# Patient Record
Sex: MALE | Race: WHITE | Employment: FULL TIME | URBAN - METROPOLITAN AREA
[De-identification: names, ages, dates, MRNs, and addresses within clinical notes are randomized per-mention and may not be internally consistent; named-entity substitution may affect disease eponyms.]

---

## 2017-01-10 ENCOUNTER — OFFICE VISIT (OUTPATIENT)
Dept: INTEGRATIVE MEDICINE | Facility: HOSPITAL | Age: 62
End: 2017-01-10
Attending: GENERAL ACUTE CARE HOSPITAL

## 2017-01-10 DIAGNOSIS — Z56.6 STRESS AT WORK: ICD-10-CM

## 2017-01-10 DIAGNOSIS — E78.5 HYPERLIPIDEMIA WITH TARGET LDL LESS THAN 100: Primary | ICD-10-CM

## 2017-01-10 DIAGNOSIS — R73.9 HIGH BLOOD SUGAR: ICD-10-CM

## 2017-01-10 DIAGNOSIS — E78.00 HIGH CHOLESTEROL: ICD-10-CM

## 2017-01-10 PROCEDURE — 97810 ACUP 1/> WO ESTIM 1ST 15 MIN: CPT | Performed by: ACUPUNCTURIST

## 2017-01-10 PROCEDURE — 97811 ACUP 1/> W/O ESTIM EA ADD 15: CPT | Performed by: ACUPUNCTURIST

## 2017-01-10 SDOH — HEALTH STABILITY - MENTAL HEALTH: OTHER PHYSICAL AND MENTAL STRAIN RELATED TO WORK: Z56.6

## 2017-01-10 NOTE — PROGRESS NOTES
Atiya Martinez has reviewed the herbs with his doctor and is going to start them. He has not done anything yet because of the busy schedule. His plan is to order them this week and then we will follow up in a month.     We will work on stress reduction to help improv

## 2017-02-14 ENCOUNTER — OFFICE VISIT (OUTPATIENT)
Dept: INTEGRATIVE MEDICINE | Facility: HOSPITAL | Age: 62
End: 2017-02-14
Attending: GENERAL ACUTE CARE HOSPITAL

## 2017-02-14 DIAGNOSIS — E78.5 HYPERLIPIDEMIA WITH TARGET LDL LESS THAN 100: Primary | ICD-10-CM

## 2017-02-14 DIAGNOSIS — Z56.6 STRESS AT WORK: ICD-10-CM

## 2017-02-14 SDOH — HEALTH STABILITY - MENTAL HEALTH: OTHER PHYSICAL AND MENTAL STRAIN RELATED TO WORK: Z56.6

## 2017-02-17 NOTE — PROGRESS NOTES
Quan Rachel finds the treatments to help keep him more calm. 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   He just got the herbs in the mail yester Aviculare 7.0 4.2% $0.88 $1.06  Qu Capri Dianthus 7.0 4.2% $0.88 $1.06  Ronnell Martínez Lycium Fruit 6.0 3.6% $1.05 $1.26  Pietro Hoyos (Piotr Hunter) Clematis Armandi 3.0 1.8% $0.38 $0.46  Total:   166.0   $35.83 $43.00  Formula and ingredient pricing are for comparison pur

## 2017-02-28 ENCOUNTER — OFFICE VISIT (OUTPATIENT)
Dept: INTEGRATIVE MEDICINE | Facility: HOSPITAL | Age: 62
End: 2017-02-28
Attending: GENERAL ACUTE CARE HOSPITAL

## 2017-02-28 DIAGNOSIS — E78.5 HYPERLIPIDEMIA WITH TARGET LDL LESS THAN 100: Primary | ICD-10-CM

## 2017-02-28 DIAGNOSIS — R73.9 HIGH BLOOD SUGAR: ICD-10-CM

## 2017-02-28 DIAGNOSIS — Z56.6 STRESS AT WORK: ICD-10-CM

## 2017-02-28 SDOH — HEALTH STABILITY - MENTAL HEALTH: OTHER PHYSICAL AND MENTAL STRAIN RELATED TO WORK: Z56.6

## 2017-02-28 NOTE — PROGRESS NOTES
Honey Pappas has been managing his stress levels and feeling well in the process. Stress overall is much reduced and he feels that any that he does have, he has been able to handle easily.   He has started taking the herbal formula and they have not caused any diges

## 2017-04-21 ENCOUNTER — OFFICE VISIT (OUTPATIENT)
Dept: INTEGRATIVE MEDICINE | Facility: HOSPITAL | Age: 62
End: 2017-04-21
Attending: GENERAL ACUTE CARE HOSPITAL

## 2017-04-21 DIAGNOSIS — Z56.6 STRESS AT WORK: Primary | ICD-10-CM

## 2017-04-21 DIAGNOSIS — M25.551 RIGHT HIP PAIN: ICD-10-CM

## 2017-04-21 SDOH — HEALTH STABILITY - MENTAL HEALTH: OTHER PHYSICAL AND MENTAL STRAIN RELATED TO WORK: Z56.6

## 2017-04-21 NOTE — PROGRESS NOTES
Estevan had a zaida Easter in [place] with his family and then was cleaning books and things out of his house. He was lifting a box that was too heavy and he strained the right side of his hip in the anterior side along the inguinal groove.  It does not fe

## 2017-05-30 ENCOUNTER — APPOINTMENT (OUTPATIENT)
Dept: INTEGRATIVE MEDICINE | Facility: HOSPITAL | Age: 62
End: 2017-05-30
Attending: GENERAL ACUTE CARE HOSPITAL

## 2017-06-01 ENCOUNTER — OFFICE VISIT (OUTPATIENT)
Dept: INTEGRATIVE MEDICINE | Facility: HOSPITAL | Age: 62
End: 2017-06-01
Attending: GENERAL ACUTE CARE HOSPITAL

## 2017-06-01 DIAGNOSIS — Z56.6 STRESS AT WORK: Primary | ICD-10-CM

## 2017-06-01 SDOH — HEALTH STABILITY - MENTAL HEALTH: OTHER PHYSICAL AND MENTAL STRAIN RELATED TO WORK: Z56.6

## 2017-06-01 NOTE — PROGRESS NOTES
Milana Bhatt has been feeling well and wanting to make sure that his stress is manageable. He has been doing a 21 day fast that he is finishing up. It is a standard process fast and he has been doing well. He has lost 10 pounds.     Objective:    Assessment:Liver qi

## 2017-06-29 ENCOUNTER — APPOINTMENT (OUTPATIENT)
Dept: INTEGRATIVE MEDICINE | Facility: HOSPITAL | Age: 62
End: 2017-06-29
Attending: GENERAL ACUTE CARE HOSPITAL

## 2017-08-01 PROBLEM — R22.1 MASS OF NECK: Status: ACTIVE | Noted: 2017-08-01

## 2017-08-10 ENCOUNTER — HOSPITAL ENCOUNTER (OUTPATIENT)
Facility: HOSPITAL | Age: 62
Setting detail: HOSPITAL OUTPATIENT SURGERY
Discharge: HOME | End: 2017-08-10
Attending: SURGERY | Admitting: SURGERY
Payer: COMMERCIAL

## 2017-08-10 ENCOUNTER — ANESTHESIA EVENT (OUTPATIENT)
Dept: SURGERY | Facility: HOSPITAL | Age: 62
End: 2017-08-10
Payer: COMMERCIAL

## 2017-08-10 ENCOUNTER — SURGERY (OUTPATIENT)
Age: 62
End: 2017-08-10

## 2017-08-10 ENCOUNTER — ANESTHESIA (OUTPATIENT)
Dept: SURGERY | Facility: HOSPITAL | Age: 62
End: 2017-08-10
Payer: COMMERCIAL

## 2017-08-10 VITALS
WEIGHT: 227 LBS | SYSTOLIC BLOOD PRESSURE: 107 MMHG | HEART RATE: 66 BPM | RESPIRATION RATE: 16 BRPM | OXYGEN SATURATION: 97 % | HEIGHT: 74 IN | TEMPERATURE: 98 F | DIASTOLIC BLOOD PRESSURE: 63 MMHG | BODY MASS INDEX: 29.13 KG/M2

## 2017-08-10 DIAGNOSIS — M79.89 MASS OF SOFT TISSUE: Primary | ICD-10-CM

## 2017-08-10 PROCEDURE — 88305 TISSUE EXAM BY PATHOLOGIST: CPT | Performed by: SURGERY

## 2017-08-10 PROCEDURE — 88304 TISSUE EXAM BY PATHOLOGIST: CPT | Performed by: SURGERY

## 2017-08-10 PROCEDURE — 0JB50ZZ EXCISION OF LEFT NECK SUBCUTANEOUS TISSUE AND FASCIA, OPEN APPROACH: ICD-10-PCS | Performed by: SURGERY

## 2017-08-10 RX ORDER — MORPHINE SULFATE 2 MG/ML
2 INJECTION, SOLUTION INTRAMUSCULAR; INTRAVENOUS EVERY 2 HOUR PRN
Status: DISCONTINUED | OUTPATIENT
Start: 2017-08-10 | End: 2017-08-10

## 2017-08-10 RX ORDER — SODIUM CHLORIDE, SODIUM LACTATE, POTASSIUM CHLORIDE, CALCIUM CHLORIDE 600; 310; 30; 20 MG/100ML; MG/100ML; MG/100ML; MG/100ML
INJECTION, SOLUTION INTRAVENOUS CONTINUOUS PRN
Status: DISCONTINUED | OUTPATIENT
Start: 2017-08-10 | End: 2017-08-10 | Stop reason: SURG

## 2017-08-10 RX ORDER — MORPHINE SULFATE 2 MG/ML
2 INJECTION, SOLUTION INTRAMUSCULAR; INTRAVENOUS EVERY 10 MIN PRN
Status: DISCONTINUED | OUTPATIENT
Start: 2017-08-10 | End: 2017-08-10

## 2017-08-10 RX ORDER — HYDROCODONE BITARTRATE AND ACETAMINOPHEN 5; 325 MG/1; MG/1
2 TABLET ORAL AS NEEDED
Status: DISCONTINUED | OUTPATIENT
Start: 2017-08-10 | End: 2017-08-10

## 2017-08-10 RX ORDER — SODIUM CHLORIDE, SODIUM LACTATE, POTASSIUM CHLORIDE, CALCIUM CHLORIDE 600; 310; 30; 20 MG/100ML; MG/100ML; MG/100ML; MG/100ML
INJECTION, SOLUTION INTRAVENOUS CONTINUOUS
Status: DISCONTINUED | OUTPATIENT
Start: 2017-08-10 | End: 2017-08-10

## 2017-08-10 RX ORDER — EPHEDRINE SULFATE 50 MG/ML
INJECTION, SOLUTION INTRAVENOUS AS NEEDED
Status: DISCONTINUED | OUTPATIENT
Start: 2017-08-10 | End: 2017-08-10 | Stop reason: SURG

## 2017-08-10 RX ORDER — DOCUSATE SODIUM 100 MG/1
100 CAPSULE, LIQUID FILLED ORAL 2 TIMES DAILY
Qty: 14 CAPSULE | Refills: 1 | Status: SHIPPED | OUTPATIENT
Start: 2017-08-10 | End: 2017-08-17

## 2017-08-10 RX ORDER — ROCURONIUM BROMIDE 10 MG/ML
INJECTION, SOLUTION INTRAVENOUS AS NEEDED
Status: DISCONTINUED | OUTPATIENT
Start: 2017-08-10 | End: 2017-08-10 | Stop reason: SURG

## 2017-08-10 RX ORDER — DEXAMETHASONE SODIUM PHOSPHATE 4 MG/ML
VIAL (ML) INJECTION AS NEEDED
Status: DISCONTINUED | OUTPATIENT
Start: 2017-08-10 | End: 2017-08-10 | Stop reason: SURG

## 2017-08-10 RX ORDER — ACETAMINOPHEN 325 MG/1
650 TABLET ORAL EVERY 4 HOURS PRN
Status: DISCONTINUED | OUTPATIENT
Start: 2017-08-10 | End: 2017-08-10

## 2017-08-10 RX ORDER — HYDROCODONE BITARTRATE AND ACETAMINOPHEN 7.5; 325 MG/1; MG/1
2 TABLET ORAL EVERY 4 HOURS PRN
Status: DISCONTINUED | OUTPATIENT
Start: 2017-08-10 | End: 2017-08-10

## 2017-08-10 RX ORDER — KETOROLAC TROMETHAMINE 30 MG/ML
INJECTION, SOLUTION INTRAMUSCULAR; INTRAVENOUS AS NEEDED
Status: DISCONTINUED | OUTPATIENT
Start: 2017-08-10 | End: 2017-08-10 | Stop reason: SURG

## 2017-08-10 RX ORDER — LIDOCAINE HYDROCHLORIDE 10 MG/ML
INJECTION, SOLUTION EPIDURAL; INFILTRATION; INTRACAUDAL; PERINEURAL AS NEEDED
Status: DISCONTINUED | OUTPATIENT
Start: 2017-08-10 | End: 2017-08-10 | Stop reason: SURG

## 2017-08-10 RX ORDER — HYDROMORPHONE HYDROCHLORIDE 1 MG/ML
0.6 INJECTION, SOLUTION INTRAMUSCULAR; INTRAVENOUS; SUBCUTANEOUS EVERY 5 MIN PRN
Status: DISCONTINUED | OUTPATIENT
Start: 2017-08-10 | End: 2017-08-10

## 2017-08-10 RX ORDER — FAMOTIDINE 20 MG/1
20 TABLET ORAL ONCE
Status: DISCONTINUED | OUTPATIENT
Start: 2017-08-10 | End: 2017-08-10 | Stop reason: HOSPADM

## 2017-08-10 RX ORDER — ACETAMINOPHEN 325 MG/1
650 TABLET ORAL ONCE
Status: COMPLETED | OUTPATIENT
Start: 2017-08-10 | End: 2017-08-10

## 2017-08-10 RX ORDER — CLINDAMYCIN PHOSPHATE 900 MG/50ML
900 INJECTION INTRAVENOUS ONCE
Status: COMPLETED | OUTPATIENT
Start: 2017-08-10 | End: 2017-08-10

## 2017-08-10 RX ORDER — NEOSTIGMINE METHYLSULFATE 0.5 MG/ML
INJECTION INTRAVENOUS AS NEEDED
Status: DISCONTINUED | OUTPATIENT
Start: 2017-08-10 | End: 2017-08-10 | Stop reason: SURG

## 2017-08-10 RX ORDER — METOCLOPRAMIDE 10 MG/1
10 TABLET ORAL ONCE
Status: DISCONTINUED | OUTPATIENT
Start: 2017-08-10 | End: 2017-08-10 | Stop reason: HOSPADM

## 2017-08-10 RX ORDER — HYDROMORPHONE HYDROCHLORIDE 1 MG/ML
0.2 INJECTION, SOLUTION INTRAMUSCULAR; INTRAVENOUS; SUBCUTANEOUS EVERY 5 MIN PRN
Status: DISCONTINUED | OUTPATIENT
Start: 2017-08-10 | End: 2017-08-10

## 2017-08-10 RX ORDER — HYDROMORPHONE HYDROCHLORIDE 1 MG/ML
0.4 INJECTION, SOLUTION INTRAMUSCULAR; INTRAVENOUS; SUBCUTANEOUS EVERY 5 MIN PRN
Status: DISCONTINUED | OUTPATIENT
Start: 2017-08-10 | End: 2017-08-10

## 2017-08-10 RX ORDER — HYDROCODONE BITARTRATE AND ACETAMINOPHEN 7.5; 325 MG/1; MG/1
1 TABLET ORAL EVERY 4 HOURS PRN
Status: DISCONTINUED | OUTPATIENT
Start: 2017-08-10 | End: 2017-08-10

## 2017-08-10 RX ORDER — MIDAZOLAM HYDROCHLORIDE 1 MG/ML
INJECTION INTRAMUSCULAR; INTRAVENOUS AS NEEDED
Status: DISCONTINUED | OUTPATIENT
Start: 2017-08-10 | End: 2017-08-10 | Stop reason: SURG

## 2017-08-10 RX ORDER — BUPIVACAINE HYDROCHLORIDE AND EPINEPHRINE 2.5; 5 MG/ML; UG/ML
INJECTION, SOLUTION INFILTRATION; PERINEURAL AS NEEDED
Status: DISCONTINUED | OUTPATIENT
Start: 2017-08-10 | End: 2017-08-10 | Stop reason: HOSPADM

## 2017-08-10 RX ORDER — HYDROCODONE BITARTRATE AND ACETAMINOPHEN 5; 325 MG/1; MG/1
1 TABLET ORAL AS NEEDED
Status: DISCONTINUED | OUTPATIENT
Start: 2017-08-10 | End: 2017-08-10

## 2017-08-10 RX ORDER — HYDROCODONE BITARTRATE AND ACETAMINOPHEN 7.5; 325 MG/1; MG/1
1-2 TABLET ORAL EVERY 6 HOURS PRN
Qty: 30 TABLET | Refills: 0 | Status: SHIPPED | OUTPATIENT
Start: 2017-08-10 | End: 2017-12-26

## 2017-08-10 RX ORDER — ONDANSETRON 2 MG/ML
INJECTION INTRAMUSCULAR; INTRAVENOUS AS NEEDED
Status: DISCONTINUED | OUTPATIENT
Start: 2017-08-10 | End: 2017-08-10 | Stop reason: SURG

## 2017-08-10 RX ORDER — MORPHINE SULFATE 4 MG/ML
4 INJECTION, SOLUTION INTRAMUSCULAR; INTRAVENOUS EVERY 10 MIN PRN
Status: DISCONTINUED | OUTPATIENT
Start: 2017-08-10 | End: 2017-08-10

## 2017-08-10 RX ORDER — ONDANSETRON 2 MG/ML
4 INJECTION INTRAMUSCULAR; INTRAVENOUS ONCE AS NEEDED
Status: DISCONTINUED | OUTPATIENT
Start: 2017-08-10 | End: 2017-08-10

## 2017-08-10 RX ORDER — HALOPERIDOL 5 MG/ML
0.25 INJECTION INTRAMUSCULAR ONCE AS NEEDED
Status: DISCONTINUED | OUTPATIENT
Start: 2017-08-10 | End: 2017-08-10

## 2017-08-10 RX ORDER — NALOXONE HYDROCHLORIDE 0.4 MG/ML
80 INJECTION, SOLUTION INTRAMUSCULAR; INTRAVENOUS; SUBCUTANEOUS AS NEEDED
Status: DISCONTINUED | OUTPATIENT
Start: 2017-08-10 | End: 2017-08-10

## 2017-08-10 RX ORDER — MORPHINE SULFATE 4 MG/ML
6 INJECTION, SOLUTION INTRAMUSCULAR; INTRAVENOUS EVERY 10 MIN PRN
Status: DISCONTINUED | OUTPATIENT
Start: 2017-08-10 | End: 2017-08-10

## 2017-08-10 RX ORDER — GLYCOPYRROLATE 0.2 MG/ML
INJECTION INTRAMUSCULAR; INTRAVENOUS AS NEEDED
Status: DISCONTINUED | OUTPATIENT
Start: 2017-08-10 | End: 2017-08-10 | Stop reason: SURG

## 2017-08-10 RX ORDER — MORPHINE SULFATE 4 MG/ML
4 INJECTION, SOLUTION INTRAMUSCULAR; INTRAVENOUS EVERY 2 HOUR PRN
Status: DISCONTINUED | OUTPATIENT
Start: 2017-08-10 | End: 2017-08-10

## 2017-08-10 RX ORDER — MORPHINE SULFATE 4 MG/ML
6 INJECTION, SOLUTION INTRAMUSCULAR; INTRAVENOUS EVERY 2 HOUR PRN
Status: DISCONTINUED | OUTPATIENT
Start: 2017-08-10 | End: 2017-08-10

## 2017-08-10 RX ORDER — ONDANSETRON 2 MG/ML
4 INJECTION INTRAMUSCULAR; INTRAVENOUS EVERY 6 HOURS PRN
Status: DISCONTINUED | OUTPATIENT
Start: 2017-08-10 | End: 2017-08-10

## 2017-08-10 RX ORDER — METOCLOPRAMIDE HYDROCHLORIDE 5 MG/ML
10 INJECTION INTRAMUSCULAR; INTRAVENOUS EVERY 6 HOURS PRN
Status: DISCONTINUED | OUTPATIENT
Start: 2017-08-10 | End: 2017-08-10

## 2017-08-10 RX ADMIN — LIDOCAINE HYDROCHLORIDE 50 MG: 10 INJECTION, SOLUTION EPIDURAL; INFILTRATION; INTRACAUDAL; PERINEURAL at 09:09:00

## 2017-08-10 RX ADMIN — KETOROLAC TROMETHAMINE 30 MG: 30 INJECTION, SOLUTION INTRAMUSCULAR; INTRAVENOUS at 10:20:00

## 2017-08-10 RX ADMIN — SODIUM CHLORIDE, SODIUM LACTATE, POTASSIUM CHLORIDE, CALCIUM CHLORIDE: 600; 310; 30; 20 INJECTION, SOLUTION INTRAVENOUS at 10:37:00

## 2017-08-10 RX ADMIN — ROCURONIUM BROMIDE 10 MG: 10 INJECTION, SOLUTION INTRAVENOUS at 09:44:00

## 2017-08-10 RX ADMIN — EPHEDRINE SULFATE 10 MG: 50 INJECTION, SOLUTION INTRAVENOUS at 09:32:00

## 2017-08-10 RX ADMIN — SODIUM CHLORIDE, SODIUM LACTATE, POTASSIUM CHLORIDE, CALCIUM CHLORIDE: 600; 310; 30; 20 INJECTION, SOLUTION INTRAVENOUS at 09:02:00

## 2017-08-10 RX ADMIN — ONDANSETRON 4 MG: 2 INJECTION INTRAMUSCULAR; INTRAVENOUS at 10:15:00

## 2017-08-10 RX ADMIN — EPHEDRINE SULFATE 5 MG: 50 INJECTION, SOLUTION INTRAVENOUS at 09:41:00

## 2017-08-10 RX ADMIN — NEOSTIGMINE METHYLSULFATE 3.5 MG: 0.5 INJECTION INTRAVENOUS at 10:15:00

## 2017-08-10 RX ADMIN — GLYCOPYRROLATE 0.6 MG: 0.2 INJECTION INTRAMUSCULAR; INTRAVENOUS at 10:15:00

## 2017-08-10 RX ADMIN — DEXAMETHASONE SODIUM PHOSPHATE 4 MG: 4 MG/ML VIAL (ML) INJECTION at 09:15:00

## 2017-08-10 RX ADMIN — EPHEDRINE SULFATE 5 MG: 50 INJECTION, SOLUTION INTRAVENOUS at 10:12:00

## 2017-08-10 RX ADMIN — MIDAZOLAM HYDROCHLORIDE 2 MG: 1 INJECTION INTRAMUSCULAR; INTRAVENOUS at 09:02:00

## 2017-08-10 RX ADMIN — ROCURONIUM BROMIDE 40 MG: 10 INJECTION, SOLUTION INTRAVENOUS at 09:10:00

## 2017-08-10 RX ADMIN — CLINDAMYCIN PHOSPHATE 900 G: 900 INJECTION INTRAVENOUS at 09:10:00

## 2017-08-10 NOTE — CONSULTS
Verona Stewart is a 57year old male. Patient presents with:  Consult: lipoma on RT side of neck      HPI:    HPI: Verona Stewart is a 57year old  male who presents for evaluation of soft tissue mass of the neck. It has been present for 10 years. Cardiovascular:   Denies Chest pain at rest.   Gastrointestinal:   Denies Difficulty swallowing. Hematology:   Denies Prolonged bleeding. Genitourinary:   Denies Blood in urine. Musculoskeletal:   Denies Painful joints.    Peripheral Vascular:   Mark Anthony Burr contact and decision-making  And coordinating the patient's care including greater than 50% face-to-face was  60  minutes.      Martina miguel      .

## 2017-08-10 NOTE — OPERATIVE REPORT
[facility]    PATIENT'S NAME: Audie    ATTENDING PHYSICIAN: Damaris Smith MD   OPERATING PHYSICIAN: Damaris Smith MD   PATIENT ACCOUNT#:   [de-identified]    LOCATION:  [address]  MEDICAL RECORD #:   [id number] incision was made with a 15 blade. Dissection continued down through subcutaneous tissue using electrocautery. Medial and lateral skin flaps were made with electrocautery.   A lipomatous mass was identified and dissected free all the way down to the regio

## 2017-08-10 NOTE — ANESTHESIA PREPROCEDURE EVALUATION
Anesthesia PreOp Note    HPI:     Betty Hogue is a 57year old male who presents for preoperative consultation requested by: Carlee Blood MD    Date of Surgery: 8/10/2017    Procedure(s):  EXCISION LESION HEAD/NECK/FACE  Indication: Soft tissu Kerri Zimmerman MD      No current [facility]-ordered outpatient prescriptions on file.       Pcn [Penicillamine]     Rash    Family History   Problem Relation Age of Onset   • Dementia Father    • Asthma Father    • Arthritis Mother    • Heart Disorder Adeel Adams FONG  8/10/2017 8:16 AM

## 2017-08-10 NOTE — ANESTHESIA POSTPROCEDURE EVALUATION
Patient: Boris Khanna    Procedure Summary     Date:  08/10/17 Room / Location:  [address] [place] [address] MAIN OR 03 / [address] [place] [address] MAIN OR    Anesthesia Start:  0902 Anesthesia Stop:      Procedure:  EXCISION LESION HEAD/NECK/FACE (N/A ) Diagnosis:  (Soft tissue mass right occipi

## 2017-08-10 NOTE — BRIEF OP NOTE
Pre-Operative Diagnosis: Soft tissue mass right occipital region      Post-Operative Diagnosis: Soft tissue mass right occipital/ neck  region      Procedure Performed:   Procedure(s):  Excision soft tissue mass right occipital/ posterior neck region

## 2017-08-10 NOTE — H&P (VIEW-ONLY)
Ronnie Randall is a 57year old male. Patient presents with:  Consult: lipoma on RT side of neck      HPI:    HPI: Ronnie Randall is a 57year old  male who presents for evaluation of soft tissue mass of the neck. It has been present for 10 years. Cardiovascular:   Denies Chest pain at rest.   Gastrointestinal:   Denies Difficulty swallowing. Hematology:   Denies Prolonged bleeding. Genitourinary:   Denies Blood in urine. Musculoskeletal:   Denies Painful joints.    Peripheral Vascular:   Radha Méndez contact and decision-making  And coordinating the patient's care including greater than 50% face-to-face was  60  minutes.      Martina miguel      .

## 2017-08-10 NOTE — INTERVAL H&P NOTE
Pre-op Diagnosis: Soft tissue mass right occipital region     The above referenced H&P was reviewed by Sonja Benavidez MD on 8/10/2017, the patient was examined and no significant changes have occurred in the patient's condition since the H&P was perfor

## 2017-12-25 ENCOUNTER — HOSPITAL ENCOUNTER (OUTPATIENT)
Age: 62
Discharge: HOME HEALTH | End: 2017-12-25

## 2017-12-25 PROCEDURE — 90471 IMMUNIZATION ADMIN: CPT

## 2017-12-25 PROCEDURE — 90686 IIV4 VACC NO PRSV 0.5 ML IM: CPT

## 2018-02-19 PROCEDURE — 84153 ASSAY OF PSA TOTAL: CPT | Performed by: UROLOGY

## 2018-02-19 PROCEDURE — 36415 COLL VENOUS BLD VENIPUNCTURE: CPT | Performed by: UROLOGY

## 2019-03-08 ENCOUNTER — APPOINTMENT (OUTPATIENT)
Dept: GENERAL RADIOLOGY | Age: 64
End: 2019-03-08
Attending: EMERGENCY MEDICINE
Payer: COMMERCIAL

## 2019-03-08 ENCOUNTER — HOSPITAL ENCOUNTER (OUTPATIENT)
Age: 64
Discharge: HOME | End: 2019-03-08
Attending: EMERGENCY MEDICINE
Payer: COMMERCIAL

## 2019-03-08 VITALS
RESPIRATION RATE: 20 BRPM | HEART RATE: 77 BPM | DIASTOLIC BLOOD PRESSURE: 84 MMHG | TEMPERATURE: 98 F | OXYGEN SATURATION: 96 % | SYSTOLIC BLOOD PRESSURE: 144 MMHG

## 2019-03-08 DIAGNOSIS — S81.809A OPEN WOUND OF LOWER EXTREMITY, UNSPECIFIED LATERALITY, INITIAL ENCOUNTER: Primary | ICD-10-CM

## 2019-03-08 PROCEDURE — 99213 OFFICE O/P EST LOW 20 MIN: CPT

## 2019-03-08 PROCEDURE — 99214 OFFICE O/P EST MOD 30 MIN: CPT

## 2019-03-08 PROCEDURE — 90471 IMMUNIZATION ADMIN: CPT

## 2019-03-08 PROCEDURE — 73590 X-RAY EXAM OF LOWER LEG: CPT | Performed by: EMERGENCY MEDICINE

## 2019-03-08 RX ORDER — SULFAMETHOXAZOLE AND TRIMETHOPRIM 800; 160 MG/1; MG/1
1 TABLET ORAL 2 TIMES DAILY
Qty: 10 TABLET | Refills: 0 | Status: SHIPPED | OUTPATIENT
Start: 2019-03-08 | End: 2019-03-13

## 2019-03-08 NOTE — ED PROVIDER NOTES
Patient Seen in: [address]    History   Patient presents with:  Rash Skin Problem (integumentary)    Stated Complaint: left shin abrasion    HPI    Patient states he struck his left leg today about 8 hours ago while  ge Current:/84   Pulse 77   Temp 98.3 °F (36.8 °C) (Oral)   Resp 20   SpO2 96%         Physical Exam    Patient is awake and alert  Extremities upon exam of the left leg there is a 5 cm linear wound present to the anterior tibial surface of the mi Impression:  Open wound of lower extremity, unspecified laterality, initial encounter  (primary encounter diagnosis)    Disposition:  Discharge  3/8/2019  5:23 pm    Follow-up:  Michael Keenan[facility]  [phone]

## 2019-03-08 NOTE — ED NOTES
Patient states he hit his left calf hard on the tub at a hotel this morning. He has a abrasion on his left middle anterior calf. No active bleeding. He also has some swelling and bruising under the abrasion.   He is able to walk on his leg without diffic

## 2019-03-08 NOTE — ED NOTES
Steri strips applied to wound per Dr. Kimberly Rudd. Protective dressing applied per tech. Discussed with patient wound care. All questions answered.

## 2020-11-06 PROBLEM — R93.1 AGATSTON CAC SCORE, <100: Status: ACTIVE | Noted: 2020-11-06

## 2021-01-20 ENCOUNTER — IMMUNIZATION (OUTPATIENT)
Dept: LAB | Facility: HOSPITAL | Age: 66
End: 2021-01-20
Attending: EMERGENCY MEDICINE
Payer: COMMERCIAL

## 2021-01-20 DIAGNOSIS — Z23 NEED FOR VACCINATION: Primary | ICD-10-CM

## 2021-01-20 PROCEDURE — 0011A SARSCOV2 VAC 100MCG/0.5ML IM: CPT

## 2021-02-28 ENCOUNTER — IMMUNIZATION (OUTPATIENT)
Dept: LAB | Facility: HOSPITAL | Age: 66
End: 2021-02-28
Attending: EMERGENCY MEDICINE
Payer: COMMERCIAL

## 2021-02-28 DIAGNOSIS — Z23 NEED FOR VACCINATION: Primary | ICD-10-CM

## 2021-02-28 PROCEDURE — 0012A SARSCOV2 VAC 100MCG/0.5ML IM: CPT

## 2021-04-11 ENCOUNTER — HOSPITAL ENCOUNTER (OUTPATIENT)
Age: 66
Discharge: HOME | End: 2021-04-11
Payer: COMMERCIAL

## 2021-04-11 VITALS
RESPIRATION RATE: 18 BRPM | OXYGEN SATURATION: 97 % | HEART RATE: 70 BPM | SYSTOLIC BLOOD PRESSURE: 134 MMHG | TEMPERATURE: 98 F | DIASTOLIC BLOOD PRESSURE: 83 MMHG

## 2021-04-11 DIAGNOSIS — R21 RASH: Primary | ICD-10-CM

## 2021-04-11 PROCEDURE — 99203 OFFICE O/P NEW LOW 30 MIN: CPT | Performed by: NURSE PRACTITIONER

## 2021-04-11 NOTE — ED PROVIDER NOTES
Patient Seen in: [facility]      History   Patient presents with:  Rash Skin Problem    Stated Complaint: skin problem    HPI/Subjective:   HPI    50-year-old male, with history of asthma and high cholesterol, presents to the immediate care Physical Exam  Vitals and nursing note reviewed. Constitutional:       General: He is not in acute distress. Appearance: Normal appearance. He is not ill-appearing.    HENT:      Mouth/Throat:      Pharynx: No oropharyngeal exudate or posterior oropha Prescribed:  Current Discharge Medication List    START taking these medications    triamcinolone acetonide 0.1 % External Cream  Apply topically 2 (two) times daily for 7 days.   Qty: 15 g Refills: 0    bacitracin 500 UNIT/GM External Ointment  Apply 1 Braulio

## 2021-10-13 ENCOUNTER — OFFICE VISIT (OUTPATIENT)
Dept: INTERNAL MEDICINE CLINIC | Facility: CLINIC | Age: 66
End: 2021-10-13
Payer: COMMERCIAL

## 2021-10-13 ENCOUNTER — LAB ENCOUNTER (OUTPATIENT)
Dept: LAB | Facility: HOSPITAL | Age: 66
End: 2021-10-13
Attending: INTERNAL MEDICINE
Payer: COMMERCIAL

## 2021-10-13 VITALS
HEIGHT: 74 IN | RESPIRATION RATE: 14 BRPM | WEIGHT: 213 LBS | DIASTOLIC BLOOD PRESSURE: 80 MMHG | OXYGEN SATURATION: 96 % | SYSTOLIC BLOOD PRESSURE: 110 MMHG | HEART RATE: 72 BPM | BODY MASS INDEX: 27.34 KG/M2

## 2021-10-13 DIAGNOSIS — Z00.00 ADULT GENERAL MEDICAL EXAM: Primary | ICD-10-CM

## 2021-10-13 DIAGNOSIS — Z12.11 SCREEN FOR COLON CANCER: ICD-10-CM

## 2021-10-13 DIAGNOSIS — Z23 NEED FOR INFLUENZA VACCINATION: ICD-10-CM

## 2021-10-13 DIAGNOSIS — Z00.00 ADULT GENERAL MEDICAL EXAM: ICD-10-CM

## 2021-10-13 PROBLEM — D17.9 LIPOMA: Status: ACTIVE | Noted: 2021-10-13

## 2021-10-13 PROCEDURE — 85027 COMPLETE CBC AUTOMATED: CPT | Performed by: INTERNAL MEDICINE

## 2021-10-13 PROCEDURE — 3074F SYST BP LT 130 MM HG: CPT | Performed by: INTERNAL MEDICINE

## 2021-10-13 PROCEDURE — 84443 ASSAY THYROID STIM HORMONE: CPT | Performed by: INTERNAL MEDICINE

## 2021-10-13 PROCEDURE — 90471 IMMUNIZATION ADMIN: CPT | Performed by: INTERNAL MEDICINE

## 2021-10-13 PROCEDURE — 83036 HEMOGLOBIN GLYCOSYLATED A1C: CPT | Performed by: INTERNAL MEDICINE

## 2021-10-13 PROCEDURE — 90662 IIV NO PRSV INCREASED AG IM: CPT | Performed by: INTERNAL MEDICINE

## 2021-10-13 PROCEDURE — 3008F BODY MASS INDEX DOCD: CPT | Performed by: INTERNAL MEDICINE

## 2021-10-13 PROCEDURE — 36415 COLL VENOUS BLD VENIPUNCTURE: CPT | Performed by: INTERNAL MEDICINE

## 2021-10-13 PROCEDURE — 99387 INIT PM E/M NEW PAT 65+ YRS: CPT | Performed by: INTERNAL MEDICINE

## 2021-10-13 PROCEDURE — 80053 COMPREHEN METABOLIC PANEL: CPT | Performed by: INTERNAL MEDICINE

## 2021-10-13 PROCEDURE — 80061 LIPID PANEL: CPT | Performed by: INTERNAL MEDICINE

## 2021-10-13 PROCEDURE — 3079F DIAST BP 80-89 MM HG: CPT | Performed by: INTERNAL MEDICINE

## 2021-10-13 RX ORDER — TADALAFIL 20 MG/1
20 TABLET ORAL
COMMUNITY

## 2021-10-13 RX ORDER — LORATADINE 10 MG/1
10 TABLET ORAL DAILY
COMMUNITY
End: 2021-10-13

## 2021-10-13 NOTE — PROGRESS NOTES
rAmando Clayton is a 76year old male. Patient presents with:  Establish Care: NP here to establish care and his yearly Physical Exam   Physical    HPI:   74-year-old gentleman with past medical history of dyslipidemia here to establish care and for physi use: Never     Family History   Problem Relation Age of Onset   • Dementia Father    • Asthma Father    • Arthritis Mother    • Heart Disorder Mother    • Hypertension Mother    • Lipids Mother         Pcn [Penicillins]       RASH     REVIEW OF SYSTEMS: Neck:      Vascular: No carotid bruit. Cardiovascular:      Rate and Rhythm: Normal rate and regular rhythm. Pulses: Normal pulses. Heart sounds: Normal heart sounds. No murmur heard.       Pulmonary:      Effort: Pulmonary effort is normal. N T4  - LIPID PANEL  - PSA SCREEN; Future    2. Screen for colon cancer    - GASTRO - INTERNAL    3. Need for influenza vaccination    - FLU VACC HIGH DOSE PRSV FREE    Plan: Labs ordered. Colonoscopy referral given. Flu vaccine given.   If everything is go

## 2022-04-23 ENCOUNTER — NURSE ONLY (OUTPATIENT)
Dept: GASTROENTEROLOGY | Facility: CLINIC | Age: 67
End: 2022-04-23

## 2022-04-23 DIAGNOSIS — Z12.11 SCREEN FOR COLON CANCER: Primary | ICD-10-CM

## 2022-04-23 NOTE — PROGRESS NOTES
Avery Lee patient for his scheduled telephone colon screening. Would prefer to schedule with Dr. Andria Nichols @ [address] [place] [address] PCP note: \"Health maintenance-he reports that he had a colonoscopy in 2016. His previous gastroenterology office called him for a repeat colonoscopy. He prefers to complete it in [employer]. \"     CBC from 10/13/2021 show no signs of anemia     Last Procedure, Date, MD:  Note found in care everywhere:   Date Name Performed by Cleo Lundy   04/05/2016 Colonoscopy  Notes:  Dr Fran Boyd - 10 years f/u Information not available    04/05/2016 Colonoscopy  Notes:  Normal       Anticoagulants: no  Diabetic Meds: no   BP meds(Ace inhibitors/ARB's): no  Weight loss meds (phentermine/vyvanse): no  Iron supplement (RX/OTC): multi vit  Height & Weight/BMI: 6'2\"/213lbs/27  Hx of Cardiac/CVA issues/(MI/Stroke): no  Devices Pacemaker/Defibrillator/Stents: no  Resp. Issues/Oxygen Use/JULIA/COPD: no  Issues w/Anesthesia: no    Symptoms (Y/N): no     Family history of colon cancer: no    Medications, pharmacy, and allergies verified with patient over the phone. Please advise on orders and prep, thank you.

## 2022-04-25 RX ORDER — POLYETHYLENE GLYCOL 3350, SODIUM CHLORIDE, SODIUM BICARBONATE, POTASSIUM CHLORIDE 420; 11.2; 5.72; 1.48 G/4L; G/4L; G/4L; G/4L
POWDER, FOR SOLUTION ORAL
Qty: 4000 ML | Refills: 0 | Status: SHIPPED | OUTPATIENT
Start: 2022-04-25

## 2022-04-25 NOTE — PROGRESS NOTES
Dania Workmans you for the quick response. Unfortunately, patient Stefany Ambrocio is utilizing [employer] therefore can only go to [facility] but it's a MAC only site. Please advise if MAC sedation would be appropriate for patient.      Thank you!!

## 2022-04-29 NOTE — PROGRESS NOTES
Called patient to help assist with scheduling procedure.  [facility]

## 2022-06-22 ENCOUNTER — TELEPHONE (OUTPATIENT)
Dept: GASTROENTEROLOGY | Facility: CLINIC | Age: 67
End: 2022-06-22

## 2022-06-22 NOTE — TELEPHONE ENCOUNTER
Stacy [facility]  12:29 PM                     Patient is calling regarding what code they are using for the procedure. Patient has questions regarding procedure.

## 2022-06-22 NOTE — TELEPHONE ENCOUNTER
Pt returned call and was given codes listed below. He will call insurance and let this office know if he needs anything further.

## 2022-06-22 NOTE — TELEPHONE ENCOUNTER
Left message for patient to return call.      Codes are in instructions     Procedure: 70769   Diagnosis: Z12.11 - colorectal cancer screening

## 2022-06-23 NOTE — TELEPHONE ENCOUNTER
Call transferred from phone room. Spoke to patient and clarified prep drinking times. Patient verbalized understanding of split dose prep.

## 2022-06-24 ENCOUNTER — SURGERY CENTER DOCUMENTATION (OUTPATIENT)
Dept: SURGERY | Age: 67
End: 2022-06-24

## 2022-06-24 DIAGNOSIS — K63.5 POLYP OF DESCENDING COLON, UNSPECIFIED TYPE: ICD-10-CM

## 2022-06-24 DIAGNOSIS — K57.30 DIVERTICULA OF COLON: ICD-10-CM

## 2022-06-29 ENCOUNTER — TELEPHONE (OUTPATIENT)
Dept: GASTROENTEROLOGY | Facility: CLINIC | Age: 67
End: 2022-06-29

## 2022-06-30 NOTE — TELEPHONE ENCOUNTER
Left VM re: path from [facility]    -polyp removed is a tubular adenoma of colon      GI staff: please place recall for colonoscopy in 5 years.  unknown hx of CRC

## 2022-07-01 NOTE — TELEPHONE ENCOUNTER
Health maintenance updated. Last colonoscopy done 6/24/22. 5 year recall placed into [facility], next due on 6/24/27 per Dr. Josie Sanchez.

## 2022-11-15 ENCOUNTER — TELEPHONE (OUTPATIENT)
Dept: GASTROENTEROLOGY | Facility: CLINIC | Age: 67
End: 2022-11-15

## 2022-11-15 NOTE — TELEPHONE ENCOUNTER
Patient requesting to speak with RN regarding his CLN results from June 2022. Please call - ok to leave detailed message. Thank you.

## 2022-11-15 NOTE — TELEPHONE ENCOUNTER
Contacted patient and verified . Reviewed result note with patient. Patient verbalized understanding.

## 2022-11-22 ENCOUNTER — HOSPITAL ENCOUNTER (OUTPATIENT)
Age: 67
Discharge: HOME | End: 2022-11-22
Payer: COMMERCIAL

## 2022-11-22 VITALS
DIASTOLIC BLOOD PRESSURE: 77 MMHG | SYSTOLIC BLOOD PRESSURE: 125 MMHG | OXYGEN SATURATION: 98 % | HEIGHT: 74 IN | RESPIRATION RATE: 16 BRPM | TEMPERATURE: 98 F | BODY MASS INDEX: 27.59 KG/M2 | WEIGHT: 215 LBS | HEART RATE: 63 BPM

## 2022-11-22 DIAGNOSIS — T14.8XXA SUPERFICIAL LACERATION: Primary | ICD-10-CM

## 2022-11-22 PROCEDURE — 99213 OFFICE O/P EST LOW 20 MIN: CPT | Performed by: NURSE PRACTITIONER

## 2022-11-22 RX ORDER — SULFAMETHOXAZOLE AND TRIMETHOPRIM 800; 160 MG/1; MG/1
1 TABLET ORAL 2 TIMES DAILY
Qty: 10 TABLET | Refills: 0 | Status: SHIPPED | OUTPATIENT
Start: 2022-11-22 | End: 2022-11-27

## 2022-11-23 NOTE — ED INITIAL ASSESSMENT (HPI)
Patient states yesterday morning he cut his chin while shaving and believes it may be infected. He has been using neosporin but it hasn't been healing.

## 2022-11-23 NOTE — DISCHARGE INSTRUCTIONS
Wash gently twice daily with soap and water. Apply antibiotic ointment twice daily. Take the Bactrim as prescribed. Follow-up as needed with your doctor. Return for any concerns.

## 2023-02-20 ENCOUNTER — HOSPITAL ENCOUNTER (OUTPATIENT)
Age: 68
Discharge: HOME | End: 2023-02-20
Payer: COMMERCIAL

## 2023-02-20 VITALS
RESPIRATION RATE: 16 BRPM | SYSTOLIC BLOOD PRESSURE: 134 MMHG | OXYGEN SATURATION: 98 % | DIASTOLIC BLOOD PRESSURE: 82 MMHG | TEMPERATURE: 98 F | HEART RATE: 94 BPM

## 2023-02-20 DIAGNOSIS — J02.9 ACUTE PHARYNGITIS, UNSPECIFIED ETIOLOGY: ICD-10-CM

## 2023-02-20 DIAGNOSIS — R03.0 ELEVATED BLOOD PRESSURE READING: ICD-10-CM

## 2023-02-20 DIAGNOSIS — J01.90 ACUTE NON-RECURRENT SINUSITIS, UNSPECIFIED LOCATION: Primary | ICD-10-CM

## 2023-02-20 PROCEDURE — 99203 OFFICE O/P NEW LOW 30 MIN: CPT | Performed by: PHYSICIAN ASSISTANT

## 2023-02-20 RX ORDER — BENZONATATE 100 MG/1
100 CAPSULE ORAL 3 TIMES DAILY PRN
Qty: 30 CAPSULE | Refills: 0 | Status: SHIPPED | OUTPATIENT
Start: 2023-02-20 | End: 2023-03-22

## 2023-02-20 RX ORDER — ROSUVASTATIN CALCIUM 10 MG/1
10 TABLET, COATED ORAL NIGHTLY
COMMUNITY

## 2023-02-20 RX ORDER — CEFDINIR 300 MG/1
300 CAPSULE ORAL 2 TIMES DAILY
Qty: 20 CAPSULE | Refills: 0 | Status: SHIPPED | OUTPATIENT
Start: 2023-02-20 | End: 2023-03-02

## 2023-02-20 RX ORDER — ALBUTEROL SULFATE 90 UG/1
2 AEROSOL, METERED RESPIRATORY (INHALATION) EVERY 4 HOURS PRN
Qty: 1 EACH | Refills: 0 | Status: SHIPPED | OUTPATIENT
Start: 2023-02-20 | End: 2023-03-22

## 2023-04-03 ENCOUNTER — TELEPHONE (OUTPATIENT)
Dept: PHYSICAL MEDICINE AND REHAB | Facility: CLINIC | Age: 68
End: 2023-04-03

## 2023-04-03 ENCOUNTER — HOSPITAL ENCOUNTER (OUTPATIENT)
Dept: GENERAL RADIOLOGY | Facility: HOSPITAL | Age: 68
Discharge: HOME | End: 2023-04-03
Attending: PHYSICAL MEDICINE & REHABILITATION
Payer: COMMERCIAL

## 2023-04-03 ENCOUNTER — OFFICE VISIT (OUTPATIENT)
Dept: PHYSICAL MEDICINE AND REHAB | Facility: CLINIC | Age: 68
End: 2023-04-03
Payer: COMMERCIAL

## 2023-04-03 VITALS — DIASTOLIC BLOOD PRESSURE: 72 MMHG | BODY MASS INDEX: 28 KG/M2 | SYSTOLIC BLOOD PRESSURE: 128 MMHG | WEIGHT: 220 LBS

## 2023-04-03 DIAGNOSIS — M51.37 DDD (DEGENERATIVE DISC DISEASE), LUMBOSACRAL: ICD-10-CM

## 2023-04-03 DIAGNOSIS — M47.816 LUMBAR SPONDYLOSIS: ICD-10-CM

## 2023-04-03 DIAGNOSIS — G25.89 SCAPULAR DYSKINESIS: ICD-10-CM

## 2023-04-03 DIAGNOSIS — M51.36 BULGE OF LUMBAR DISC WITHOUT MYELOPATHY: ICD-10-CM

## 2023-04-03 DIAGNOSIS — M16.11 PRIMARY OSTEOARTHRITIS OF RIGHT HIP: ICD-10-CM

## 2023-04-03 DIAGNOSIS — M54.59 MECHANICAL LOW BACK PAIN: ICD-10-CM

## 2023-04-03 DIAGNOSIS — M79.10 MYALGIA: Primary | ICD-10-CM

## 2023-04-03 DIAGNOSIS — M99.9 BIOMECHANICAL LESION: ICD-10-CM

## 2023-04-03 DIAGNOSIS — M70.62 GREATER TROCHANTERIC BURSITIS OF BOTH HIPS: ICD-10-CM

## 2023-04-03 DIAGNOSIS — M47.816 FACET SYNDROME, LUMBAR: ICD-10-CM

## 2023-04-03 DIAGNOSIS — M70.61 GREATER TROCHANTERIC BURSITIS OF BOTH HIPS: ICD-10-CM

## 2023-04-03 DIAGNOSIS — S76.019A STRAIN OF GLUTEUS MEDIUS, UNSPECIFIED LATERALITY, INITIAL ENCOUNTER: ICD-10-CM

## 2023-04-03 DIAGNOSIS — M48.061 LUMBAR FORAMINAL STENOSIS: ICD-10-CM

## 2023-04-03 DIAGNOSIS — M54.16 LUMBAR RADICULOPATHY: ICD-10-CM

## 2023-04-03 DIAGNOSIS — M79.10 MYALGIA: ICD-10-CM

## 2023-04-03 PROCEDURE — 72110 X-RAY EXAM L-2 SPINE 4/>VWS: CPT | Performed by: PHYSICAL MEDICINE & REHABILITATION

## 2023-04-03 NOTE — PATIENT INSTRUCTIONS
1) Continue Ibuprofen 400-600 mg every 8 hours as need for pain  2) Please get X-rays of the Lumbar spine today on your way out. 3) Tylenol 500-1000 mg every 6-8 hours as needed for pain. No more than 3000 mg daily. 4) Please call and schedule your MRI at [phone]. Once you have your MRI scheduled, then call my office again to schedule a follow-up visit soon after your MRI so we may review the images together. 5) My office will call you to schedule the RIGHT greater trochanter bursa CSI under ultrasound guidance once the procedure is approved by your insurance carrier. 6) Lets touch base after the MRI to review the images and discuss next steps. We can also consider right hip lidocaine injection as a diagnostic approach      Post Injection Instructions     Please do not do anything strenuous over the next two days (if you had a knee injection do not walk more than 2 city blocks, do not attend any aerobic classes, do not run, no heavy lifting, no prolong standing). You may resume your day to day activities after your injection. You may experience some mild amount of swelling after the procedure. Please ice your joint that was injected at least 5-6 times a day (15 minutes) for two days after (this will help prevent worsening pain that sometimes occurs after an injection). Only take tylenol if needed for pain for the first few days. Watch for signs of infection which include redness, warmth, worsening pain, fevers or chills. If you develop any of these signs call the office immediately at [phone]    Everyone responds differently to injections, but you can expect your peak effects a few weeks after your last injection. Simran Junior.  Andrea Gibbs MD  Physical Medicine and Rehabilitation/Sports Medicine  [facility]

## 2023-04-03 NOTE — TELEPHONE ENCOUNTER
Initiated authorization for RIGHT greater trochanter CSI under ultrasound guidance CPT 81218,  with [facility]  Status: Approved valid 4/3/23-7/2/23  Notification or Prior Authorization is not required for the requested services    This [employer] plan does not currently require a prior authorization for these services. If you have general questions about the prior authorization requirements, please call us at [phone] or visit [employer] > Clinician Resources > Advance and Admission Notification Requirements. The number above acknowledges your notification. Please write this number down for future reference. Notification is not a guarantee of coverage or payment.     Decision ID #:[id number]  inj done in office

## 2023-04-25 ENCOUNTER — HOSPITAL ENCOUNTER (OUTPATIENT)
Dept: MRI IMAGING | Facility: HOSPITAL | Age: 68
Discharge: HOME | End: 2023-04-25
Attending: PHYSICAL MEDICINE & REHABILITATION
Payer: COMMERCIAL

## 2023-04-25 DIAGNOSIS — M54.16 LUMBAR RADICULOPATHY: ICD-10-CM

## 2023-04-25 DIAGNOSIS — M51.36 BULGE OF LUMBAR DISC WITHOUT MYELOPATHY: ICD-10-CM

## 2023-04-25 DIAGNOSIS — M48.061 LUMBAR FORAMINAL STENOSIS: ICD-10-CM

## 2023-04-25 DIAGNOSIS — M70.61 GREATER TROCHANTERIC BURSITIS OF BOTH HIPS: ICD-10-CM

## 2023-04-25 DIAGNOSIS — M16.11 PRIMARY OSTEOARTHRITIS OF RIGHT HIP: ICD-10-CM

## 2023-04-25 DIAGNOSIS — M54.59 MECHANICAL LOW BACK PAIN: ICD-10-CM

## 2023-04-25 DIAGNOSIS — M47.816 FACET SYNDROME, LUMBAR: ICD-10-CM

## 2023-04-25 DIAGNOSIS — M47.816 LUMBAR SPONDYLOSIS: ICD-10-CM

## 2023-04-25 DIAGNOSIS — M70.62 GREATER TROCHANTERIC BURSITIS OF BOTH HIPS: ICD-10-CM

## 2023-04-25 DIAGNOSIS — M79.10 MYALGIA: ICD-10-CM

## 2023-04-25 DIAGNOSIS — M51.37 DDD (DEGENERATIVE DISC DISEASE), LUMBOSACRAL: ICD-10-CM

## 2023-04-25 DIAGNOSIS — M99.9 BIOMECHANICAL LESION: ICD-10-CM

## 2023-04-25 DIAGNOSIS — S76.019A STRAIN OF GLUTEUS MEDIUS, UNSPECIFIED LATERALITY, INITIAL ENCOUNTER: ICD-10-CM

## 2023-04-25 DIAGNOSIS — G25.89 SCAPULAR DYSKINESIS: ICD-10-CM

## 2023-04-25 PROCEDURE — 72148 MRI LUMBAR SPINE W/O DYE: CPT | Performed by: PHYSICAL MEDICINE & REHABILITATION

## 2023-05-02 ENCOUNTER — OFFICE VISIT (OUTPATIENT)
Dept: PHYSICAL MEDICINE AND REHAB | Facility: CLINIC | Age: 68
End: 2023-05-02
Payer: COMMERCIAL

## 2023-05-02 VITALS
SYSTOLIC BLOOD PRESSURE: 122 MMHG | WEIGHT: 220 LBS | DIASTOLIC BLOOD PRESSURE: 70 MMHG | HEIGHT: 74 IN | BODY MASS INDEX: 28.23 KG/M2

## 2023-05-02 DIAGNOSIS — M54.16 LUMBAR RADICULOPATHY: ICD-10-CM

## 2023-05-02 DIAGNOSIS — G25.89 SCAPULAR DYSKINESIS: ICD-10-CM

## 2023-05-02 DIAGNOSIS — M16.11 PRIMARY OSTEOARTHRITIS OF RIGHT HIP: ICD-10-CM

## 2023-05-02 DIAGNOSIS — M47.816 FACET SYNDROME, LUMBAR: ICD-10-CM

## 2023-05-02 DIAGNOSIS — M99.9 BIOMECHANICAL LESION: ICD-10-CM

## 2023-05-02 DIAGNOSIS — M51.36 BULGE OF LUMBAR DISC WITHOUT MYELOPATHY: ICD-10-CM

## 2023-05-02 DIAGNOSIS — M51.37 DDD (DEGENERATIVE DISC DISEASE), LUMBOSACRAL: ICD-10-CM

## 2023-05-02 DIAGNOSIS — S76.019A STRAIN OF GLUTEUS MEDIUS, UNSPECIFIED LATERALITY, INITIAL ENCOUNTER: ICD-10-CM

## 2023-05-02 DIAGNOSIS — M70.61 GREATER TROCHANTERIC BURSITIS OF BOTH HIPS: ICD-10-CM

## 2023-05-02 DIAGNOSIS — M79.10 MYALGIA: Primary | ICD-10-CM

## 2023-05-02 DIAGNOSIS — M54.59 MECHANICAL LOW BACK PAIN: ICD-10-CM

## 2023-05-02 DIAGNOSIS — M47.816 LUMBAR SPONDYLOSIS: ICD-10-CM

## 2023-05-02 DIAGNOSIS — M70.62 GREATER TROCHANTERIC BURSITIS OF BOTH HIPS: ICD-10-CM

## 2023-05-02 DIAGNOSIS — M48.061 LUMBAR FORAMINAL STENOSIS: ICD-10-CM

## 2023-05-02 PROCEDURE — 3008F BODY MASS INDEX DOCD: CPT | Performed by: PHYSICAL MEDICINE & REHABILITATION

## 2023-05-02 PROCEDURE — 3078F DIAST BP <80 MM HG: CPT | Performed by: PHYSICAL MEDICINE & REHABILITATION

## 2023-05-02 PROCEDURE — 99214 OFFICE O/P EST MOD 30 MIN: CPT | Performed by: PHYSICAL MEDICINE & REHABILITATION

## 2023-05-02 PROCEDURE — 3074F SYST BP LT 130 MM HG: CPT | Performed by: PHYSICAL MEDICINE & REHABILITATION

## 2023-05-02 NOTE — PATIENT INSTRUCTIONS
1) Curcumin 1000 mg daily  2) Glucosamine with chondroitin 1500/1200 mg daily  3) Start formal physical therapy as soon as possible at the [facility]  4) Tylenol 500-1000 mg every 6-8 hours as needed for pain. No more than 3000 mg daily. 5) If local lateral hip pain returns, then we could repeat the trochanter injections.  If back pain with radicular symptoms down the leg occurs, then would recommend RIGHT L4 and RIGHT L5 TFESI

## 2023-05-11 ENCOUNTER — TELEPHONE (OUTPATIENT)
Dept: PHYSICAL THERAPY | Facility: HOSPITAL | Age: 68
End: 2023-05-11

## 2023-05-25 ENCOUNTER — TELEPHONE (OUTPATIENT)
Dept: PHYSICAL THERAPY | Facility: HOSPITAL | Age: 68
End: 2023-05-25

## 2023-06-02 ENCOUNTER — OFFICE VISIT (OUTPATIENT)
Dept: PHYSICAL THERAPY | Age: 68
End: 2023-06-02
Attending: PHYSICAL MEDICINE & REHABILITATION
Payer: COMMERCIAL

## 2023-06-02 DIAGNOSIS — M99.9 BIOMECHANICAL LESION: ICD-10-CM

## 2023-06-02 DIAGNOSIS — M51.37 DDD (DEGENERATIVE DISC DISEASE), LUMBOSACRAL: ICD-10-CM

## 2023-06-02 DIAGNOSIS — M47.816 LUMBAR SPONDYLOSIS: ICD-10-CM

## 2023-06-02 DIAGNOSIS — S76.019A STRAIN OF GLUTEUS MEDIUS, UNSPECIFIED LATERALITY, INITIAL ENCOUNTER: ICD-10-CM

## 2023-06-02 DIAGNOSIS — G25.89 SCAPULAR DYSKINESIS: ICD-10-CM

## 2023-06-02 DIAGNOSIS — M54.59 MECHANICAL LOW BACK PAIN: ICD-10-CM

## 2023-06-02 DIAGNOSIS — M70.62 GREATER TROCHANTERIC BURSITIS OF BOTH HIPS: ICD-10-CM

## 2023-06-02 DIAGNOSIS — M70.61 GREATER TROCHANTERIC BURSITIS OF BOTH HIPS: ICD-10-CM

## 2023-06-02 DIAGNOSIS — M79.10 MYALGIA: ICD-10-CM

## 2023-06-02 DIAGNOSIS — M47.816 FACET SYNDROME, LUMBAR: ICD-10-CM

## 2023-06-02 DIAGNOSIS — M16.11 PRIMARY OSTEOARTHRITIS OF RIGHT HIP: ICD-10-CM

## 2023-06-02 DIAGNOSIS — M51.36 BULGE OF LUMBAR DISC WITHOUT MYELOPATHY: ICD-10-CM

## 2023-06-02 DIAGNOSIS — M54.16 LUMBAR RADICULOPATHY: ICD-10-CM

## 2023-06-02 DIAGNOSIS — M48.061 LUMBAR FORAMINAL STENOSIS: ICD-10-CM

## 2023-06-02 PROCEDURE — 97162 PT EVAL MOD COMPLEX 30 MIN: CPT | Performed by: PHYSICAL THERAPIST

## 2023-06-02 PROCEDURE — 97110 THERAPEUTIC EXERCISES: CPT | Performed by: PHYSICAL THERAPIST

## 2023-06-02 NOTE — PATIENT INSTRUCTIONS
Patient was instructed in and issued a HEP for pronelying to extension in pronelying sustained 1-2 mins each to repeated lumbar extension in pronelying x 10 reps or in standing 10 reps 4-6x/day (every 2-3 hours); posture correction in sitting using a lumbar roll (recommended); avoidance of sitting on a couch/sofa/recliner.

## 2023-06-06 ENCOUNTER — OFFICE VISIT (OUTPATIENT)
Dept: PHYSICAL THERAPY | Age: 68
End: 2023-06-06
Attending: PHYSICAL MEDICINE & REHABILITATION
Payer: COMMERCIAL

## 2023-06-06 PROCEDURE — 97110 THERAPEUTIC EXERCISES: CPT | Performed by: PHYSICAL THERAPIST

## 2023-06-09 ENCOUNTER — APPOINTMENT (OUTPATIENT)
Dept: PHYSICAL THERAPY | Age: 68
End: 2023-06-09
Attending: PHYSICAL MEDICINE & REHABILITATION
Payer: COMMERCIAL

## 2023-06-09 PROCEDURE — 97110 THERAPEUTIC EXERCISES: CPT | Performed by: PHYSICAL THERAPIST

## 2023-06-09 NOTE — PROGRESS NOTES
Date: 6/9/2023         Dx: Myalgia (M79.10)  DDD (degenerative disc disease), lumbosacral (M51.37)  Lumbar radiculopathy (M54.16)  Facet syndrome, lumbar (M47.816)  Mechanical low back pain (M54.59)  Lumbar foraminal stenosis (M48.061)  Lumbar spondylosis (M47.816)  Bulge of lumbar disc without myelopathy (M51.36)  Primary osteoarthritis of right hip (M16.11)  Strain of gluteus medius, unspecified laterality, initial encounter (S76.019A)  Scapular dyskinesis (G25.89)  Greater trochanteric bursitis of both hips (M70.61,M70.62)  Biomechanical lesion (M99.9)             Authorized # of Visits:  12       Referring MD:  Rito Nava. Next MD visit: none scheduled    Medication Changes since last visit?: No    Subjective:  Johnathan Yeboah report that he walked a lot yesterday and felt a pain/ache at the side of his (R) foot. He was not sure if it was due to his back or from his foot. He performed pronelying in extension (SASKIA) that night and felt better the next day. He wants to review how to do his REIL with the sheet overpressure. He has not performed that exercise yet because he was sure how to do it correctly. Objective:   ROM: (Pre-test symptom: 0/10 lower back; 0/10 (R) lateral hip)  Lumbar        Movement Loss Pain (+/-)   Flexion Ni loss NE (B) hamstring tightness   Extension Nil loss NE    R Sideglide Nil loss NE   L Sideglide Nil loss  NE     Date: 6/6/2023  Tx#: 2/12 Date: 6/9/2023  Tx#: 3/12 Date: Tx#: 4/ Date: Tx#: 5/ Date: Tx#: 6/ Date: Tx#: 7/ Date:    Tx#: 8/   Pronelying x 1 min; Dec, A    Extension in pronelying x 2 mins; NE    REIL x 10 reps; NE     REIL sag x 5 (3+2) reps; NE    REIL belt OP at pelvis x 10 reps; NE tight lower back    Sustained extension with belt OP at pelvis x 2+2 mins (higher angle); P, NW tight/ache lower back    REIL with sheet OP x 10 reps; NE tight lower back    ANABELA over rail x 5 reps; P, NW mid lower back Pronelying x 1 min; Dec, B    REIL sag x 10 (8+2) reps; NE    REIL with belt OP x 10 reps; Dec, B    Sustained lumbar extension with belt OP at pelvis using mat/table x 3+3 mins (higher angle); Dec, A (R) foot ache    REIL with sheet OP x 10 reps; NE    Sitting posture correction with lumbar roll    ANABELA hands on post/wall x 10 reps; NE stretch lower back              Assessment/HEP:   Johnathan Yeboah continue to respond to lumbar extension directional preference exercises but with overpressure. The ache in the (R) foot abolished after sustained lumbar extension with belt OP at the pelvis. He was reviewed REIL with sheet overpressure exercise to perform correctly at home. Explained to Johnathan Yeboah the derangement principle and the importance of performing his HEP (ANABELA hands on wall or post 10 reps or REIL with sheet OP 10 reps 3-4x/day) and posture correction consistently to have a good carryover of his symptoms. Reminded patient to use a lumbar roll while long driving this weekend. All questions and concerns were answered and addressed at this time. Goals:   (12 visits)  1. Patient to consistently perform her HEP and it's progression to maintain her improved condition. 2. Patient to have reduced, centralized, and abolished symptoms in the low back to enable easier ADLs, functional, work, and recreational activities. 3. Patient to have WNL of lumbar mobility in all directions to be able to  sit prolonged, rise up from sitting, walk prolonged on uneven ground, and get out of his vehicle without producing or increasing symptoms in the (R) lateral buttock and lateral calf. 4. Patient to consistently have good posture to promote her symptomfree condition. Plan:   Re-assess next session and continue with directional preference exercise, posture correction, patient education, and HEP progression. Charges:  TherEx x 3       Total Timed Treatment: 47 mins Total Treatment Time: 48 mins

## 2023-06-13 ENCOUNTER — OFFICE VISIT (OUTPATIENT)
Dept: PHYSICAL THERAPY | Age: 68
End: 2023-06-13
Attending: PHYSICAL MEDICINE & REHABILITATION
Payer: COMMERCIAL

## 2023-06-13 PROCEDURE — 97110 THERAPEUTIC EXERCISES: CPT | Performed by: PHYSICAL THERAPIST

## 2023-06-13 NOTE — PROGRESS NOTES
Date: 6/13/2023         Dx: Myalgia (M79.10)  DDD (degenerative disc disease), lumbosacral (M51.37)  Lumbar radiculopathy (M54.16)  Facet syndrome, lumbar (M47.816)  Mechanical low back pain (M54.59)  Lumbar foraminal stenosis (M48.061)  Lumbar spondylosis (M47.816)  Bulge of lumbar disc without myelopathy (M51.36)  Primary osteoarthritis of right hip (M16.11)  Strain of gluteus medius, unspecified laterality, initial encounter (S76.019A)  Scapular dyskinesis (G25.89)  Greater trochanteric bursitis of both hips (M70.61,M70.62)  Biomechanical lesion (M99.9)             Authorized # of Visits:  12       Referring MD:  Fred Treviño. Next MD visit: none scheduled    Medication Changes since last visit?: No    Subjective:  Jimmy Velásquez report that his back is still a bit achy in the middle of the back but better than before. He drove up to [place] again and walked a lot over the weekend. His back was better this time. He was using a lumbar roll and did his HEP (ANABELA hands on wall) regularly. Objective:   ROM: (Pre-test symptom: 0/10 lower back; 0/10 (R) lateral hip)  Lumbar        Movement Loss Pain (+/-)   Flexion Ni loss NE (R) posterior thigh tightness   Extension Nil loss NE    R Sideglide Nil loss NE   L Sideglide Nil loss  NE     Date: 6/6/2023  Tx#: 2/12 Date: 6/9/2023  Tx#: 3/12 Date: 6/13/2023  Tx#: 4/12 Date: Tx#: 5/ Date: Tx#: 6/ Date: Tx#: 7/ Date:    Tx#: 8/   Pronelying x 1 min; Dec, A    Extension in pronelying x 2 mins; NE    REIL x 10 reps; NE     REIL sag x 5 (3+2) reps; NE    REIL belt OP at pelvis x 10 reps; NE tight lower back    Sustained extension with belt OP at pelvis x 2+2 mins (higher angle); P, NW tight/ache lower back    REIL with sheet OP x 10 reps; NE tight lower back    ANABELA over rail x 5 reps; P, NW mid lower back Pronelying x 1 min; Dec, B    REIL sag x 10 (8+2) reps; NE    REIL with belt OP x 10 reps; Dec, B    Sustained lumbar extension with belt OP at pelvis using mat/table x 3+3 mins (higher angle); Dec, A (R) foot ache    REIL with sheet OP x 10 reps; NE    Sitting posture correction with lumbar roll    ANABELA hands on post/wall x 10 reps; NE stretch lower back     TM: Retro walk @ 5% grade x 1.0 mph x 10 mins; NE    ANABELA hands on wall x 10 mins; NE    Pronelying x 1 min; NE unload back    Prone: Alt LE lift x 10 reps; P, NW mid back and (R) lateral buttock    Prone: (B) UE extension 10 reps x 10 cts ea; NE    Prone: (B) UE h.abduction 10 reps x 10 cts ea; NE tired    Prone: (B) UE scaption 10 reps x 10 cts ea; NE tired    Prone: back extension 10 reps x 10 cts ea; NE tired    Sitting posture correction with lumbar roll; NE (rest)    ANABELA hands on wall x 10 reps; NE         Assessment/HEP:   Wing Garza continue to respond to lumbar extension directional preference exercises but with overpressure. The he has WNL of lumbar AROM in all directions with only endrange stretch at the (R) posterior thigh. He is consistent with his HEP (ANABELA hands on wall) and is also improving on his sitting posture. He was progressed today with postural stability and strengthening exercises in pronelying. He required minimal cueing to correct his posture and exercise technique. He was tired but no production of back and LE symptoms and no loss of lumbar AROM. All questions and concerns were answered and addressed at this time. Goals:   (12 visits)  1. Patient to consistently perform her HEP and it's progression to maintain her improved condition. 2. Patient to have reduced, centralized, and abolished symptoms in the low back to enable easier ADLs, functional, work, and recreational activities. 3. Patient to have WNL of lumbar mobility in all directions to be able to  sit prolonged, rise up from sitting, walk prolonged on uneven ground, and get out of his vehicle without producing or increasing symptoms in the (R) lateral buttock and lateral calf.    4. Patient to consistently have good posture to promote her symptomfree condition. Plan:   Re-assess next session and continue with directional preference exercise, posture correction, patient education, and HEP progression. Charges:  TherEx x 3       Total Timed Treatment: 40 mins Total Treatment Time: 41 mins

## 2023-06-16 ENCOUNTER — APPOINTMENT (OUTPATIENT)
Dept: PHYSICAL THERAPY | Age: 68
End: 2023-06-16
Attending: PHYSICAL MEDICINE & REHABILITATION
Payer: COMMERCIAL

## 2023-06-21 ENCOUNTER — OFFICE VISIT (OUTPATIENT)
Dept: PHYSICAL THERAPY | Age: 68
End: 2023-06-21
Attending: PHYSICAL MEDICINE & REHABILITATION
Payer: COMMERCIAL

## 2023-06-21 PROCEDURE — 97110 THERAPEUTIC EXERCISES: CPT | Performed by: PHYSICAL THERAPIST

## 2023-06-21 NOTE — PROGRESS NOTES
Date: 6/21/2023         Dx: Myalgia (M79.10)  DDD (degenerative disc disease), lumbosacral (M51.37)  Lumbar radiculopathy (M54.16)  Facet syndrome, lumbar (M47.816)  Mechanical low back pain (M54.59)  Lumbar foraminal stenosis (M48.061)  Lumbar spondylosis (M47.816)  Bulge of lumbar disc without myelopathy (M51.36)  Primary osteoarthritis of right hip (M16.11)  Strain of gluteus medius, unspecified laterality, initial encounter (S76.019A)  Scapular dyskinesis (G25.89)  Greater trochanteric bursitis of both hips (M70.61,M70.62)  Biomechanical lesion (M99.9)             Authorized # of Visits:  12       Referring MD:  Emy Villanueva. Next MD visit: none scheduled    Medication Changes since last visit?: No    Subjective:  Shara Montoya report that his back feels good. There is no pain/ache at this time. He occasionally would feel a tingling in the (R) foot but easily goes away with the HEP (back extension). He is using a lumbar roll regularly when he is driving. He drove up to [place] over the weekend and felt good during and after the drive. Objective:   ROM: (Pre-test symptom: 0/10 lower back; 0/10 (R) lateral hip)  Lumbar        Movement Loss Pain (+/-)   Flexion Ni loss NE (R) posterior thigh tightness   Extension Nil loss NE    R Sideglide Nil loss NE   L Sideglide Nil loss  NE     Date: 6/6/2023  Tx#: 2/12 Date: 6/9/2023  Tx#: 3/12 Date: 6/13/2023  Tx#: 4/12 Date: 6/21/2023  Tx#: 5/12 Date: Tx#: 6/ Date: Tx#: 7/ Date:    Tx#: 8/   Pronelying x 1 min; Dec, A    Extension in pronelying x 2 mins; NE    REIL x 10 reps; NE     REIL sag x 5 (3+2) reps; NE    REIL belt OP at pelvis x 10 reps; NE tight lower back    Sustained extension with belt OP at pelvis x 2+2 mins (higher angle); P, NW tight/ache lower back    REIL with sheet OP x 10 reps; NE tight lower back    ANABELA over rail x 5 reps; P, NW mid lower back Pronelying x 1 min; Dec, B    REIL sag x 10 (8+2) reps; NE    REIL with belt OP x 10 reps; Dec, B    Sustained lumbar extension with belt OP at pelvis using mat/table x 3+3 mins (higher angle); Dec, A (R) foot ache    REIL with sheet OP x 10 reps; NE    Sitting posture correction with lumbar roll    ANABELA hands on post/wall x 10 reps; NE stretch lower back     TM: Retro walk @ 5% grade x 1.0 mph x 10 mins; NE    ANABELA hands on wall x 10 mins; NE    Pronelying x 1 min; NE unload back    Prone: Alt LE lift x 10 reps; P, NW mid back and (R) lateral buttock    Prone: (B) UE extension 10 reps x 10 cts ea; NE    Prone: (B) UE h.abduction 10 reps x 10 cts ea; NE tired    Prone: (B) UE scaption 10 reps x 10 cts ea; NE tired    Prone: back extension 10 reps x 10 cts ea; NE tired    Sitting posture correction with lumbar roll; NE (rest)    ANABELA hands on wall x 10 reps; NE TM: Retro walk @ 8% grade x 1.0 mph x 10 mins; NE    ANABELA hands on wall x 10 mins; NE    Prone: Alt LE lift 3 lbs 2 x 10 reps; NE    Prone: (B) UE rhytmic-stab extension 2 lbs x 5+3 sets  x 10 bounces ea; NE    Prone: (B) UE rhytmic-stab  H.abduction 1 lb x 5+3 sets x 10 bouces ea; NE tired    Prone: (B) UE rhtymic-stab scaption 1 lb 5+1 sets x 5 bounces ea; NE tired    Prone: back extension (skydiver) 10 reps x 10 cts ea; NE     Hydrant (R/L) LE 1 kg ball 5 reps x 10 cts ea; NE    Monster walk fwd/bkwd with greenTB abd resist 2 laps x 30 feet; NE     ANABELA hands on wall x 10 reps; NE        Assessment/HEP:   Destiny Banks continue to respond to lumbar extension directional preference exercises but with overpressure. He is maintaining WNL of lumbar AROM in all directions with only endrange stretch toward flexion and (L) SG motions. He is progressing with dynamic postural stability exercises without symptoms. He require cueing to correct form, posture, and alignment. All questions and concerns were answered and addressed at this time. Goals:   (12 visits)  1. Patient to consistently perform her HEP and it's progression to maintain her improved condition. 2. Patient to have reduced, centralized, and abolished symptoms in the low back to enable easier ADLs, functional, work, and recreational activities. 3. Patient to have WNL of lumbar mobility in all directions to be able to  sit prolonged, rise up from sitting, walk prolonged on uneven ground, and get out of his vehicle without producing or increasing symptoms in the (R) lateral buttock and lateral calf. 4. Patient to consistently have good posture to promote her symptomfree condition. Plan:   Re-assess next session and continue with directional preference exercise, posture correction, patient education, and HEP progression. Charges:  TherEx x 3       Total Timed Treatment: 47 mins Total Treatment Time: 48 mins

## 2023-06-28 ENCOUNTER — OFFICE VISIT (OUTPATIENT)
Dept: PHYSICAL THERAPY | Age: 68
End: 2023-06-28
Attending: PHYSICAL MEDICINE & REHABILITATION
Payer: COMMERCIAL

## 2023-06-28 PROCEDURE — 97110 THERAPEUTIC EXERCISES: CPT | Performed by: PHYSICAL THERAPIST

## 2023-07-05 ENCOUNTER — OFFICE VISIT (OUTPATIENT)
Dept: PHYSICAL THERAPY | Age: 68
End: 2023-07-05
Attending: PHYSICAL MEDICINE & REHABILITATION
Payer: COMMERCIAL

## 2023-07-05 PROCEDURE — 97110 THERAPEUTIC EXERCISES: CPT | Performed by: PHYSICAL THERAPIST

## 2023-07-05 NOTE — PROGRESS NOTES
Date: 7/5/2023         Dx: Myalgia (M79.10)  DDD (degenerative disc disease), lumbosacral (M51.37)  Lumbar radiculopathy (M54.16)  Facet syndrome, lumbar (M47.816)  Mechanical low back pain (M54.59)  Lumbar foraminal stenosis (M48.061)  Lumbar spondylosis (M47.816)  Bulge of lumbar disc without myelopathy (M51.36)  Primary osteoarthritis of right hip (M16.11)  Strain of gluteus medius, unspecified laterality, initial encounter (S76.019A)  Scapular dyskinesis (G25.89)  Greater trochanteric bursitis of both hips (M70.61,M70.62)  Biomechanical lesion (M99.9)             Authorized # of Visits:  12       Referring MD:  Rito Nava. Next MD visit: none scheduled    Medication Changes since last visit?: No    Subjective:  Johnathan Yeboah report his back feels good. There is no pain and he even drove up to [place] over the weekend. He state that he would feel a tightness/ache in the (R) lower lateral calf area occasionally. He does not feel it right this moment. He has been consistent with his HEP (ANABELA hands on wall). He also report (R) shoulder ache while performing standing postural strengthening/stability exercises. Objective: (+) (R) LE dural nerve tightness in sitting.      ROM: (Pre-test symptom: 0/10 lower back; 0/10 (R) lateral hip)  Lumbar        Movement Loss Pain (+/-)   Flexion Ni loss NE (R) LE tightness   Extension Nil loss NE    R Sideglide Nil loss NE   L Sideglide Nil loss  NE     Date: 6/6/2023  Tx#: 2/12 Date: 6/9/2023  Tx#: 3/12 Date: 6/13/2023  Tx#: 4/12 Date: 6/21/2023  Tx#: 5/12 Date: 6/28/2023  Tx#: 6/12 Date: 7/5/2023  Tx#: 7/12   Pronelying x 1 min; Dec, A    Extension in pronelying x 2 mins; NE    REIL x 10 reps; NE     REIL sag x 5 (3+2) reps; NE    REIL belt OP at pelvis x 10 reps; NE tight lower back    Sustained extension with belt OP at pelvis x 2+2 mins (higher angle); P, NW tight/ache lower back    REIL with sheet OP x 10 reps; NE tight lower back    ANABELA over rail x 5 reps; P, NW mid lower back Pronelying x 1 min; Dec, B    REIL sag x 10 (8+2) reps; NE    REIL with belt OP x 10 reps; Dec, B    Sustained lumbar extension with belt OP at pelvis using mat/table x 3+3 mins (higher angle); Dec, A (R) foot ache    REIL with sheet OP x 10 reps; NE    Sitting posture correction with lumbar roll    ANABELA hands on post/wall x 10 reps; NE stretch lower back     TM: Retro walk @ 5% grade x 1.0 mph x 10 mins; NE    ANABELA hands on wall x 10 mins; NE    Pronelying x 1 min; NE unload back    Prone: Alt LE lift x 10 reps; P, NW mid back and (R) lateral buttock    Prone: (B) UE extension 10 reps x 10 cts ea; NE    Prone: (B) UE h.abduction 10 reps x 10 cts ea; NE tired    Prone: (B) UE scaption 10 reps x 10 cts ea; NE tired    Prone: back extension 10 reps x 10 cts ea; NE tired    Sitting posture correction with lumbar roll; NE (rest)    ANABELA hands on wall x 10 reps; NE TM: Retro walk @ 8% grade x 1.0 mph x 10 mins; NE    ANABELA hands on wall x 10 mins; NE    Prone: Alt LE lift 3 lbs 2 x 10 reps; NE    Prone: (B) UE rhytmic-stab extension 2 lbs x 5+3 sets  x 10 bounces ea; NE    Prone: (B) UE rhytmic-stab  H.abduction 1 lb x 5+3 sets x 10 bouces ea; NE tired    Prone: (B) UE rhtymic-stab scaption 1 lb 5+1 sets x 5 bounces ea; NE tired    Prone: back extension (skydiver) 10 reps x 10 cts ea; NE     Hydrant (R/L) LE 1 kg ball 5 reps x 10 cts ea; NE    Monster walk fwd/bkwd with greenTB abd resist 2 laps x 30 feet; NE     ANABELA hands on wall x 10 reps; NE TM: Retro walk @ 10% grade x 0.8 mph x 10 mins; NE    ANABELA hands on wall x 10 mins; NE    Prone: (B) UE rhytmic-stab extension 2 lbs x 10 sets  x 10 bounces ea; NE    Prone: (B) UE rhytmic-stab  H.abduction 1 lb x 10 sets x 10 bouces ea; NE tired    Prone: (B) UE rhtymic-stab scaption 0 wt 10 sets x 5 bounces ea; NE tired    Prone:  Alt LE lift with 3 lbs 3  sets x 10 reps ea; NE tired    Prone: back extension (skydiver) 10 reps x 10 cts ea; NE     Hydrant (R/L) LE 1 kg ball 5 reps x 15 cts ea; NE    ANABELA hands on wall x 10 reps; NE     TM: Retro walk @ 10% grade x 0.8 mph x 10 mins; NE    ANABELA hands on wall x 10 mins; NE    Seated: (R) LE dural nerve stretch 5 reps x 5 cts ea; NE    ANABELA hands on wall x 10 reps; NE    (B) UE blueTB n.row, extension, w.row 10 reps; NE back, P, NW (R) shoulder    (R) Shoulder repeated internal rotation x 10 reps; P, NW     - less (R) shoulder ache at endrange flexion and ER     + self OP with opposite arm x 10 reps; P, NW    + towel OP x 10 reps; P, NW     - less (R) shoulder ache at endrange of flexion and ER    ANABELA hand on wall x 10 resps; NE stretch low back     Assessment/HEP:   Shara Montoya was instructed to add (R) LE dural nerve stretch in sitting but to start and end with a lumbar extension exercise. Added (R) shoulder repeated internal rotation with self overpressure to relieve (R) shoulder ache during postural strengthening exercises. He was issued and instructed on his new HEP ((R) LE dural nerve stretch and repeated (R) shoulder internal rotation). Explained the importance of performing repeated lumbar extension before and after the (R) LE dural nerve stretch in sitting. Reinforced posture correction and consistency with his home exercises. He agreed and understood the treatment plan. All questions and concerns were answered and addressed at this time. Goals:   (12 visits)  1. Patient to consistently perform her HEP and it's progression to maintain her improved condition. 2. Patient to have reduced, centralized, and abolished symptoms in the low back to enable easier ADLs, functional, work, and recreational activities. 3. Patient to have WNL of lumbar mobility in all directions to be able to  sit prolonged, rise up from sitting, walk prolonged on uneven ground, and get out of his vehicle without producing or increasing symptoms in the (R) lateral buttock and lateral calf.    4. Patient to consistently have good posture to promote her symptomfree condition. Plan:   Re-assess next session and continue with directional preference exercise, posture correction, patient education, and HEP progression. Charges:  TherEx x 4       Total Timed Treatment: 53 mins Total Treatment Time: 54 mins

## 2023-07-12 ENCOUNTER — OFFICE VISIT (OUTPATIENT)
Dept: PHYSICAL THERAPY | Age: 68
End: 2023-07-12
Attending: PHYSICAL MEDICINE & REHABILITATION
Payer: COMMERCIAL

## 2023-07-12 PROCEDURE — 97110 THERAPEUTIC EXERCISES: CPT | Performed by: PHYSICAL THERAPIST

## 2023-07-12 NOTE — PROGRESS NOTES
Date: 7/12/2023         Dx: Myalgia (M79.10)  DDD (degenerative disc disease), lumbosacral (M51.37)  Lumbar radiculopathy (M54.16)  Facet syndrome, lumbar (M47.816)  Mechanical low back pain (M54.59)  Lumbar foraminal stenosis (M48.061)  Lumbar spondylosis (M47.816)  Bulge of lumbar disc without myelopathy (M51.36)  Primary osteoarthritis of right hip (M16.11)  Strain of gluteus medius, unspecified laterality, initial encounter (S76.019A)  Scapular dyskinesis (G25.89)  Greater trochanteric bursitis of both hips (M70.61,M70.62)  Biomechanical lesion (M99.9)             Authorized # of Visits:  12       Referring MD:  Demario Perez. Next MD visit: none scheduled    Medication Changes since last visit?: No    Subjective:  Yasemin Merlos report his back feels good. There is no pain and he even drove up to [place] over the weekend. He state that he would feel a tightness/ache in the (R) lower lateral calf area occasionally. He does not feel it right this moment. He has been consistent with his HEP (ANABELA hands on wall). He also report (R) shoulder ache while performing standing postural strengthening/stability exercises. Objective: (+) (R) LE dural nerve tightness in sitting.      ROM: (Pre-test symptom: 0/10 lower back; 0/10 (R) lateral hip)  Lumbar        Movement Loss Pain (+/-)   Flexion Ni loss NE (R) LE tightness   Extension Nil loss NE    R Sideglide Nil loss NE   L Sideglide Nil loss  NE     Date: 6/6/2023  Tx#: 2/12 Date: 6/9/2023  Tx#: 3/12 Date: 6/13/2023  Tx#: 4/12 Date: 6/21/2023  Tx#: 5/12 Date: 6/28/2023  Tx#: 6/12 Date: 7/5/2023  Tx#: 7/12   Pronelying x 1 min; Dec, A    Extension in pronelying x 2 mins; NE    REIL x 10 reps; NE     REIL sag x 5 (3+2) reps; NE    REIL belt OP at pelvis x 10 reps; NE tight lower back    Sustained extension with belt OP at pelvis x 2+2 mins (higher angle); P, NW tight/ache lower back    REIL with sheet OP x 10 reps; NE tight lower back    ANABELA over rail x 5 reps; P, NW mid lower back Pronelying x 1 min; Dec, B    REIL sag x 10 (8+2) reps; NE    REIL with belt OP x 10 reps; Dec, B    Sustained lumbar extension with belt OP at pelvis using mat/table x 3+3 mins (higher angle); Dec, A (R) foot ache    REIL with sheet OP x 10 reps; NE    Sitting posture correction with lumbar roll    ANABELA hands on post/wall x 10 reps; NE stretch lower back     TM: Retro walk @ 5% grade x 1.0 mph x 10 mins; NE    ANABELA hands on wall x 10 mins; NE    Pronelying x 1 min; NE unload back    Prone: Alt LE lift x 10 reps; P, NW mid back and (R) lateral buttock    Prone: (B) UE extension 10 reps x 10 cts ea; NE    Prone: (B) UE h.abduction 10 reps x 10 cts ea; NE tired    Prone: (B) UE scaption 10 reps x 10 cts ea; NE tired    Prone: back extension 10 reps x 10 cts ea; NE tired    Sitting posture correction with lumbar roll; NE (rest)    ANABELA hands on wall x 10 reps; NE TM: Retro walk @ 8% grade x 1.0 mph x 10 mins; NE    ANABELA hands on wall x 10 mins; NE    Prone: Alt LE lift 3 lbs 2 x 10 reps; NE    Prone: (B) UE rhytmic-stab extension 2 lbs x 5+3 sets  x 10 bounces ea; NE    Prone: (B) UE rhytmic-stab  H.abduction 1 lb x 5+3 sets x 10 bouces ea; NE tired    Prone: (B) UE rhtymic-stab scaption 1 lb 5+1 sets x 5 bounces ea; NE tired    Prone: back extension (skydiver) 10 reps x 10 cts ea; NE     Hydrant (R/L) LE 1 kg ball 5 reps x 10 cts ea; NE    Monster walk fwd/bkwd with greenTB abd resist 2 laps x 30 feet; NE     ANABELA hands on wall x 10 reps; NE TM: Retro walk @ 10% grade x 0.8 mph x 10 mins; NE    ANABELA hands on wall x 10 mins; NE    Prone: (B) UE rhytmic-stab extension 2 lbs x 10 sets  x 10 bounces ea; NE    Prone: (B) UE rhytmic-stab  H.abduction 1 lb x 10 sets x 10 bouces ea; NE tired    Prone: (B) UE rhtymic-stab scaption 0 wt 10 sets x 5 bounces ea; NE tired    Prone:  Alt LE lift with 3 lbs 3  sets x 10 reps ea; NE tired    Prone: back extension (skydiver) 10 reps x 10 cts ea; NE     Hydrant (R/L) LE 1 kg ball 5 reps x 15 cts ea; NE    ANABELA hands on wall x 10 reps; NE     TM: Retro walk @ 10% grade x 0.8 mph x 10 mins; NE    ANABELA hands on wall x 10 mins; NE    Seated: (R) LE dural nerve stretch 5 reps x 5 cts ea; NE    ANABELA hands on wall x 10 reps; NE    (B) UE blueTB n.row, extension, w.row 10 reps; NE back, P, NW (R) shoulder    (R) Shoulder repeated internal rotation x 10 reps; P, NW     - less (R) shoulder ache at endrange flexion and ER     + self OP with opposite arm x 10 reps; P, NW    + towel OP x 10 reps; P, NW     - less (R) shoulder ache at endrange of flexion and ER    ANABELA hand on wall x 10 resps; NE stretch low back     Date: 7/12/2023  Tx#: 8/12       TM: Retro walk @ 10% grade x 0.8 mph x 10 mins; NE    ANABELA hands on wall x 10 mins; NE    RFISitting knees @ 90-45-0 deg x 10 reps ea; P, NW lower back and hamstring stretch/ache    ANABELA hands on wall x 10 reps; NE    (R) Shoulder repeated IR with towel x 10 reps; NE stretch     + belt OP x 10 reps; NE more stretch    (R) Shoulder eccentric IR with greenTB 10 reps x 10 eccen ct ea; NE tired    (B) UE rhytmic-stab extension blueTB 10 sets x 10 bounces ea; NE tired    Hydrant: (R/L) rhytmic-stab 1 kg ball 5 reps x 10 bounces ea; NE tired    ANABELA hands on wall x 10 reps; NE         Assessment/HEP:   Susie Gutierrez was initiated with return to flexion program for the lower back in sitting. Explained to Susie Gutierrez the importance of doing his lumbar extension before and after lumbar flexion exercise to maintain his improved condition. He was also issued copies of his new HEP for the lower back and (R) shoulder. All questions and concerns were answered and addressed at this time. Goals:   (12 visits)  1. Patient to consistently perform her HEP and it's progression to maintain her improved condition. 2. Patient to have reduced, centralized, and abolished symptoms in the low back to enable easier ADLs, functional, work, and recreational activities.    3. Patient to have WNL of lumbar mobility in all directions to be able to  sit prolonged, rise up from sitting, walk prolonged on uneven ground, and get out of his vehicle without producing or increasing symptoms in the (R) lateral buttock and lateral calf. 4. Patient to consistently have good posture to promote her symptomfree condition. Plan:   Re-assess next session and continue with directional preference exercise, posture correction, patient education, and HEP progression. Charges:  TherEx x 3       Total Timed Treatment: 48 mins Total Treatment Time: 49 mins

## 2023-07-14 ENCOUNTER — APPOINTMENT (OUTPATIENT)
Dept: PHYSICAL THERAPY | Age: 68
End: 2023-07-14
Attending: PHYSICAL MEDICINE & REHABILITATION
Payer: COMMERCIAL

## 2023-07-18 ENCOUNTER — PATIENT MESSAGE (OUTPATIENT)
Dept: PHYSICAL MEDICINE AND REHAB | Facility: CLINIC | Age: 68
End: 2023-07-18

## 2023-07-19 ENCOUNTER — OFFICE VISIT (OUTPATIENT)
Dept: PHYSICAL THERAPY | Age: 68
End: 2023-07-19
Attending: PHYSICAL MEDICINE & REHABILITATION
Payer: COMMERCIAL

## 2023-07-19 PROCEDURE — 97110 THERAPEUTIC EXERCISES: CPT | Performed by: PHYSICAL THERAPIST

## 2023-07-19 NOTE — PROGRESS NOTES
Date: 7/12/2023         Dx: Myalgia (M79.10)  DDD (degenerative disc disease), lumbosacral (M51.37)  Lumbar radiculopathy (M54.16)  Facet syndrome, lumbar (M47.816)  Mechanical low back pain (M54.59)  Lumbar foraminal stenosis (M48.061)  Lumbar spondylosis (M47.816)  Bulge of lumbar disc without myelopathy (M51.36)  Primary osteoarthritis of right hip (M16.11)  Strain of gluteus medius, unspecified laterality, initial encounter (S76.019A)  Scapular dyskinesis (G25.89)  Greater trochanteric bursitis of both hips (M70.61,M70.62)  Biomechanical lesion (M99.9)             Authorized # of Visits:  12       Referring MD:  Saumya Orta. Next MD visit: none scheduled    Medication Changes since last visit?: No    Subjective:  Sapna Hopkins report his back feels good. There is no pain and he even drove up to [place] over the weekend. He state that he would feel a tightness/ache in the (R) lower lateral calf area occasionally. He does not feel it right this moment. He has been consistent with his HEP (ANABELA hands on wall). He also report (R) shoulder ache while performing standing postural strengthening/stability exercises. Objective: (+) (R) LE dural nerve tightness in sitting.      ROM: (Pre-test symptom: 0/10 lower back; 0/10 (R) lateral hip)  Lumbar        Movement Loss Pain (+/-)   Flexion Ni loss NE (R) LE tightness   Extension Nil loss NE    R Sideglide Nil loss NE   L Sideglide Nil loss  NE     Date: 6/6/2023  Tx#: 2/12 Date: 6/9/2023  Tx#: 3/12 Date: 6/13/2023  Tx#: 4/12 Date: 6/21/2023  Tx#: 5/12 Date: 6/28/2023  Tx#: 6/12 Date: 7/5/2023  Tx#: 7/12   Pronelying x 1 min; Dec, A    Extension in pronelying x 2 mins; NE    REIL x 10 reps; NE     REIL sag x 5 (3+2) reps; NE    REIL belt OP at pelvis x 10 reps; NE tight lower back    Sustained extension with belt OP at pelvis x 2+2 mins (higher angle); P, NW tight/ache lower back    REIL with sheet OP x 10 reps; NE tight lower back    ANABELA over rail x 5 reps; P, NW mid lower back Pronelying x 1 min; Dec, B    REIL sag x 10 (8+2) reps; NE    REIL with belt OP x 10 reps; Dec, B    Sustained lumbar extension with belt OP at pelvis using mat/table x 3+3 mins (higher angle); Dec, A (R) foot ache    REIL with sheet OP x 10 reps; NE    Sitting posture correction with lumbar roll    ANABELA hands on post/wall x 10 reps; NE stretch lower back     TM: Retro walk @ 5% grade x 1.0 mph x 10 mins; NE    ANABELA hands on wall x 10 mins; NE    Pronelying x 1 min; NE unload back    Prone: Alt LE lift x 10 reps; P, NW mid back and (R) lateral buttock    Prone: (B) UE extension 10 reps x 10 cts ea; NE    Prone: (B) UE h.abduction 10 reps x 10 cts ea; NE tired    Prone: (B) UE scaption 10 reps x 10 cts ea; NE tired    Prone: back extension 10 reps x 10 cts ea; NE tired    Sitting posture correction with lumbar roll; NE (rest)    ANABELA hands on wall x 10 reps; NE TM: Retro walk @ 8% grade x 1.0 mph x 10 mins; NE    ANABELA hands on wall x 10 mins; NE    Prone: Alt LE lift 3 lbs 2 x 10 reps; NE    Prone: (B) UE rhytmic-stab extension 2 lbs x 5+3 sets  x 10 bounces ea; NE    Prone: (B) UE rhytmic-stab  H.abduction 1 lb x 5+3 sets x 10 bouces ea; NE tired    Prone: (B) UE rhtymic-stab scaption 1 lb 5+1 sets x 5 bounces ea; NE tired    Prone: back extension (skydiver) 10 reps x 10 cts ea; NE     Hydrant (R/L) LE 1 kg ball 5 reps x 10 cts ea; NE    Monster walk fwd/bkwd with greenTB abd resist 2 laps x 30 feet; NE     ANABELA hands on wall x 10 reps; NE TM: Retro walk @ 10% grade x 0.8 mph x 10 mins; NE    ANABELA hands on wall x 10 mins; NE    Prone: (B) UE rhytmic-stab extension 2 lbs x 10 sets  x 10 bounces ea; NE    Prone: (B) UE rhytmic-stab  H.abduction 1 lb x 10 sets x 10 bouces ea; NE tired    Prone: (B) UE rhtymic-stab scaption 0 wt 10 sets x 5 bounces ea; NE tired    Prone:  Alt LE lift with 3 lbs 3  sets x 10 reps ea; NE tired    Prone: back extension (skydiver) 10 reps x 10 cts ea; NE     Hydrant (R/L) LE 1 kg ball 5 reps x 15 cts ea; NE    ANABELA hands on wall x 10 reps; NE     TM: Retro walk @ 10% grade x 0.8 mph x 10 mins; NE    ANABELA hands on wall x 10 mins; NE    Seated: (R) LE dural nerve stretch 5 reps x 5 cts ea; NE    ANABELA hands on wall x 10 reps; NE    (B) UE blueTB n.row, extension, w.row 10 reps; NE back, P, NW (R) shoulder    (R) Shoulder repeated internal rotation x 10 reps; P, NW     - less (R) shoulder ache at endrange flexion and ER     + self OP with opposite arm x 10 reps; P, NW    + towel OP x 10 reps; P, NW     - less (R) shoulder ache at endrange of flexion and ER    ANABELA hand on wall x 10 resps; NE stretch low back     Date: 7/12/2023  Tx#: 8/12 Date: 7/19/2023  Tx#: 9/12      TM: Retro walk @ 10% grade x 0.8 mph x 10 mins; NE    ANABELA hands on wall x 10 mins; NE    RFISitting knees @ 90-45-0 deg x 10 reps ea; P, NW lower back and hamstring stretch/ache    ANABELA hands on wall x 10 reps; NE    (R) Shoulder repeated IR with towel x 10 reps; NE stretch     + belt OP x 10 reps; NE more stretch    (R) Shoulder eccentric IR with greenTB 10 reps x 10 eccen ct ea; NE tired    (B) UE rhytmic-stab extension blueTB 10 sets x 10 bounces ea; NE tired    Hydrant: (R/L) rhytmic-stab 1 kg ball 5 reps x 10 bounces ea; NE tired    ANABLEA hands on wall x 10 reps; NE TM: Retro walk @ 10% grade x 0.8 mph x 6 mins; NE    ANABELA hands on wall x 10 mins; NE    RFISitting knees @ 90-45-0 deg x 10 reps ea; P, NW lower back and hamstring stretch/ache    ANABELA hands on wall x 10 reps; NE    (R) Shoulder repeated IR with  belt OP x 10 reps; NE     (R) Shoulder eccentric IR with blueTB 10 reps x 10 eccen ct ea; NE tired    (B) UE rhytmic-stab extension blueTB 10 sets x 10 bounces ea; NE tired    Hydrant: (R/L) LE 1 kg ball 5 reps x 10 bounces ea; NE tired    ANABELA hands on wall x 10 reps; NE                Assessment/HEP:   Randy Rodriguez was reviewed his HEP of return to flexion program in sitting.   He was able to demonstrate the correct exercise technique (ANABELA-RFISitt- ANABELA) without producing symptoms. He was progressed with postural stability/strengthening exercises. He was also reviewed his (R) shoulder repeated IR with self OP and issued a blueTB to progress eccentric strengthening. Goals:   (12 visits)  1. Patient to consistently perform her HEP and it's progression to maintain her improved condition. 2. Patient to have reduced, centralized, and abolished symptoms in the low back to enable easier ADLs, functional, work, and recreational activities. 3. Patient to have WNL of lumbar mobility in all directions to be able to  sit prolonged, rise up from sitting, walk prolonged on uneven ground, and get out of his vehicle without producing or increasing symptoms in the (R) lateral buttock and lateral calf. 4. Patient to consistently have good posture to promote her symptomfree condition. Plan:   Re-assess next session and continue with directional preference exercise, posture correction, patient education, and HEP progression. Charges:  TherEx x 3       Total Timed Treatment: 39 mins Total Treatment Time: 40 mins

## 2023-07-19 NOTE — TELEPHONE ENCOUNTER
From: Denny Walters  To: Alex Behar, MD  Sent: 2023 12:50 PM CDT  Subject:  Appointment.     Dr. Behar—-  I need to request your help on getting my daughter-in-law in to see you for an initial appointment because she is suffering from lower back pain. She has seen a chiropractor.   I initially went in a reserved an appointment (which was one) under my name for  at 4 pm to get something set up because she needs your help.   I now need your help in transferring the appointment to her: Stefanie Walters.   Here is the info:   Stefanie Walters  Phone: [phone]  : 86  [address] [facility] [address]    Please call me to discuss. My cell is [phone]  Sourav Mark

## 2023-07-19 NOTE — TELEPHONE ENCOUNTER
Message forwarded to front office staff to assist in scheduling a new patient appointment for patient's daughter.

## 2023-07-26 ENCOUNTER — OFFICE VISIT (OUTPATIENT)
Dept: PHYSICAL THERAPY | Age: 68
End: 2023-07-26
Attending: PHYSICAL MEDICINE & REHABILITATION
Payer: COMMERCIAL

## 2023-07-26 PROCEDURE — 97110 THERAPEUTIC EXERCISES: CPT | Performed by: PHYSICAL THERAPIST

## 2023-07-26 NOTE — PROGRESS NOTES
Date: 7/26/2023         Dx: Myalgia (M79.10)  DDD (degenerative disc disease), lumbosacral (M51.37)  Lumbar radiculopathy (M54.16)  Facet syndrome, lumbar (M47.816)  Mechanical low back pain (M54.59)  Lumbar foraminal stenosis (M48.061)  Lumbar spondylosis (M47.816)  Bulge of lumbar disc without myelopathy (M51.36)  Primary osteoarthritis of right hip (M16.11)  Strain of gluteus medius, unspecified laterality, initial encounter (S76.019A)  Scapular dyskinesis (G25.89)  Greater trochanteric bursitis of both hips (M70.61,M70.62)  Biomechanical lesion (M99.9)             Authorized # of Visits:  12       Referring MD:  Weston Asif. Next MD visit: none scheduled    Medication Changes since last visit?: No    Subjective:  Nanda Sandifer report he has been feeling a discomfort at the top part of the (R) foot. He can reduce it by doing his HEP (ANABELA hands on wall) but does not go away. He said that he was doing the return to flexion in sitting but not regularly and he has been driving to [place] this past weekend.       Objective: (+) (R) LE dural nerve tightness in sitting;      ROM: (Pre-test symptom: 1/10 Dorsum of (R) foot; 0/10 (R) lateral hip)  Lumbar        Movement Loss Pain (+/-)   Flexion Minimal loss P, NW  (R) posterior thigh tightness   Extension Nil loss NE  stretch mid back   R Sideglide Nil loss NE   L Sideglide Minimal loss  P, NW (R) lateral hip/buttock     Date: 6/6/2023  Tx#: 2/12 Date: 6/9/2023  Tx#: 3/12 Date: 6/13/2023  Tx#: 4/12 Date: 6/21/2023  Tx#: 5/12 Date: 6/28/2023  Tx#: 6/12 Date: 7/5/2023  Tx#: 7/12   Pronelying x 1 min; Dec, A    Extension in pronelying x 2 mins; NE    REIL x 10 reps; NE     REIL sag x 5 (3+2) reps; NE    REIL belt OP at pelvis x 10 reps; NE tight lower back    Sustained extension with belt OP at pelvis x 2+2 mins (higher angle); P, NW tight/ache lower back    REIL with sheet OP x 10 reps; NE tight lower back    ANABELA over rail x 5 reps; P, NW mid lower back Pronelying x 1 min; Dec, B    REIL sag x 10 (8+2) reps; NE    REIL with belt OP x 10 reps; Dec, B    Sustained lumbar extension with belt OP at pelvis using mat/table x 3+3 mins (higher angle); Dec, A (R) foot ache    REIL with sheet OP x 10 reps; NE    Sitting posture correction with lumbar roll    ANABELA hands on post/wall x 10 reps; NE stretch lower back     TM: Retro walk @ 5% grade x 1.0 mph x 10 mins; NE    ANABELA hands on wall x 10 mins; NE    Pronelying x 1 min; NE unload back    Prone: Alt LE lift x 10 reps; P, NW mid back and (R) lateral buttock    Prone: (B) UE extension 10 reps x 10 cts ea; NE    Prone: (B) UE h.abduction 10 reps x 10 cts ea; NE tired    Prone: (B) UE scaption 10 reps x 10 cts ea; NE tired    Prone: back extension 10 reps x 10 cts ea; NE tired    Sitting posture correction with lumbar roll; NE (rest)    ANABELA hands on wall x 10 reps; NE TM: Retro walk @ 8% grade x 1.0 mph x 10 mins; NE    ANABELA hands on wall x 10 mins; NE    Prone: Alt LE lift 3 lbs 2 x 10 reps; NE    Prone: (B) UE rhytmic-stab extension 2 lbs x 5+3 sets  x 10 bounces ea; NE    Prone: (B) UE rhytmic-stab  H.abduction 1 lb x 5+3 sets x 10 bouces ea; NE tired    Prone: (B) UE rhtymic-stab scaption 1 lb 5+1 sets x 5 bounces ea; NE tired    Prone: back extension (skydiver) 10 reps x 10 cts ea; NE     Hydrant (R/L) LE 1 kg ball 5 reps x 10 cts ea; NE    Monster walk fwd/bkwd with greenTB abd resist 2 laps x 30 feet; NE     ANABELA hands on wall x 10 reps; NE TM: Retro walk @ 10% grade x 0.8 mph x 10 mins; NE    ANABELA hands on wall x 10 mins; NE    Prone: (B) UE rhytmic-stab extension 2 lbs x 10 sets  x 10 bounces ea; NE    Prone: (B) UE rhytmic-stab  H.abduction 1 lb x 10 sets x 10 bouces ea; NE tired    Prone: (B) UE rhtymic-stab scaption 0 wt 10 sets x 5 bounces ea; NE tired    Prone:  Alt LE lift with 3 lbs 3  sets x 10 reps ea; NE tired    Prone: back extension (skydiver) 10 reps x 10 cts ea; NE     Hydrant (R/L) LE 1 kg ball 5 reps x 15 cts ea; NE    ANABELA hands on wall x 10 reps; NE     TM: Retro walk @ 10% grade x 0.8 mph x 10 mins; NE    ANABELA hands on wall x 10 mins; NE    Seated: (R) LE dural nerve stretch 5 reps x 5 cts ea; NE    ANABELA hands on wall x 10 reps; NE    (B) UE blueTB n.row, extension, w.row 10 reps; NE back, P, NW (R) shoulder    (R) Shoulder repeated internal rotation x 10 reps; P, NW     - less (R) shoulder ache at endrange flexion and ER     + self OP with opposite arm x 10 reps; P, NW    + towel OP x 10 reps; P, NW     - less (R) shoulder ache at endrange of flexion and ER    ANABELA hand on wall x 10 resps; NE stretch low back     Date: 7/12/2023  Tx#: 8/12 Date: 7/19/2023  Tx#: 9/12 Date: 7/26/2023  Tx#: 10/12     TM: Retro walk @ 10% grade x 0.8 mph x 10 mins; NE    ANABELA hands on wall x 10 mins; NE    RFISitting knees @ 90-45-0 deg x 10 reps ea; P, NW lower back and hamstring stretch/ache    ANABELA hands on wall x 10 reps; NE    (R) Shoulder repeated IR with towel x 10 reps; NE stretch     + belt OP x 10 reps; NE more stretch    (R) Shoulder eccentric IR with greenTB 10 reps x 10 eccen ct ea; NE tired    (B) UE rhytmic-stab extension blueTB 10 sets x 10 bounces ea; NE tired    Hydrant: (R/L) rhytmic-stab 1 kg ball 5 reps x 10 bounces ea; NE tired    ANABELA hands on wall x 10 reps; NE TM: Retro walk @ 10% grade x 0.8 mph x 6 mins; NE    ANABELA hands on wall x 10 mins; NE    RFISitting knees @ 90-45-0 deg x 10 reps ea; P, NW lower back and hamstring stretch/ache    ANABELA hands on wall x 10 reps; NE    (R) Shoulder repeated IR with  belt OP x 10 reps; NE     (R) Shoulder eccentric IR with blueTB 10 reps x 10 eccen ct ea; NE tired    (B) UE rhytmic-stab extension blueTB 10 sets x 10 bounces ea; NE tired    Hydrant: (R/L) LE 1 kg ball 5 reps x 10 bounces ea; NE tired    ANABELA hands on wall x 10 reps; NE         TM: Retro walk @ 10% grade x 0.8 mph x 6 mins; NE    Pronelying x 2 mins; Dec, NB    Extension in pronelying x 2 mins; Dec, A (R) foot; P, W (L) thigh (cramp/ache)    REIL x 10 reps; Dec, Czd to back (tired arms)    Sustained lumbar extension with belt OP at pelvis using mat/table x 3+3+3 mins (higher angle); Dec, A (feels good in the legs)    REIL with belt OP x 10 reps; NE stretch back    Sitting posture correction with lumbar roll               Assessment/HEP:   Lori Sam continue to respond to lumbar extension directional preference in sustained position. His lower leg symptoms abolished and centralized to the lower back. He was instructed to do sustained lumbar extension using 2-3 pillows by his chest and stay there for 5 mins prior to sleeping at night and to do ANABELA during the day but to apply a lot of pressure to the back doing the back bend. Reviewed and reinforced the importance of sitting up straight with a lumbar roll. He agreed and understood his treatment plan. All questions and concerns were answered and addressed at this time. Goals:   (12 visits)  1. Patient to consistently perform her HEP and it's progression to maintain her improved condition. 2. Patient to have reduced, centralized, and abolished symptoms in the low back to enable easier ADLs, functional, work, and recreational activities. 3. Patient to have WNL of lumbar mobility in all directions to be able to  sit prolonged, rise up from sitting, walk prolonged on uneven ground, and get out of his vehicle without producing or increasing symptoms in the (R) lateral buttock and lateral calf. 4. Patient to consistently have good posture to promote her symptomfree condition. Plan:   Re-assess next session and continue with directional preference exercise, posture correction, patient education, and HEP progression. Charges:  TherEx x 3       Total Timed Treatment: 43 mins Total Treatment Time: 44 mins

## 2023-08-01 ENCOUNTER — APPOINTMENT (OUTPATIENT)
Dept: PHYSICAL THERAPY | Age: 68
End: 2023-08-01
Attending: PHYSICAL MEDICINE & REHABILITATION
Payer: COMMERCIAL

## 2023-08-25 ENCOUNTER — APPOINTMENT (OUTPATIENT)
Dept: PHYSICAL THERAPY | Age: 68
End: 2023-08-25
Attending: PHYSICAL MEDICINE & REHABILITATION
Payer: COMMERCIAL

## 2023-08-30 ENCOUNTER — OFFICE VISIT (OUTPATIENT)
Dept: PHYSICAL THERAPY | Age: 68
End: 2023-08-30
Attending: PHYSICAL MEDICINE & REHABILITATION
Payer: COMMERCIAL

## 2023-08-30 PROCEDURE — 97110 THERAPEUTIC EXERCISES: CPT | Performed by: PHYSICAL THERAPIST

## 2023-09-01 ENCOUNTER — APPOINTMENT (OUTPATIENT)
Dept: PHYSICAL THERAPY | Age: 68
End: 2023-09-01
Attending: PHYSICAL MEDICINE & REHABILITATION
Payer: COMMERCIAL

## 2023-09-08 ENCOUNTER — APPOINTMENT (OUTPATIENT)
Dept: PHYSICAL THERAPY | Age: 68
End: 2023-09-08
Attending: PHYSICAL MEDICINE & REHABILITATION
Payer: COMMERCIAL

## 2023-09-12 ENCOUNTER — APPOINTMENT (OUTPATIENT)
Dept: PHYSICAL THERAPY | Age: 68
End: 2023-09-12
Attending: PHYSICAL MEDICINE & REHABILITATION
Payer: COMMERCIAL

## 2024-02-08 ENCOUNTER — OFFICE VISIT (OUTPATIENT)
Dept: PHYSICAL MEDICINE AND REHAB | Facility: CLINIC | Age: 69
End: 2024-02-08
Payer: COMMERCIAL

## 2024-02-08 VITALS — WEIGHT: 220 LBS | BODY MASS INDEX: 28.23 KG/M2 | HEIGHT: 74 IN

## 2024-02-08 DIAGNOSIS — M47.816 LUMBAR SPONDYLOSIS: ICD-10-CM

## 2024-02-08 DIAGNOSIS — M70.62 GREATER TROCHANTERIC BURSITIS OF BOTH HIPS: ICD-10-CM

## 2024-02-08 DIAGNOSIS — M51.37 DDD (DEGENERATIVE DISC DISEASE), LUMBOSACRAL: ICD-10-CM

## 2024-02-08 DIAGNOSIS — M16.11 PRIMARY OSTEOARTHRITIS OF RIGHT HIP: ICD-10-CM

## 2024-02-08 DIAGNOSIS — M54.16 LUMBAR RADICULOPATHY: ICD-10-CM

## 2024-02-08 DIAGNOSIS — M99.9 BIOMECHANICAL LESION: ICD-10-CM

## 2024-02-08 DIAGNOSIS — M54.59 MECHANICAL LOW BACK PAIN: ICD-10-CM

## 2024-02-08 DIAGNOSIS — S76.019A STRAIN OF GLUTEUS MEDIUS, UNSPECIFIED LATERALITY, INITIAL ENCOUNTER: ICD-10-CM

## 2024-02-08 DIAGNOSIS — G25.89 SCAPULAR DYSKINESIS: ICD-10-CM

## 2024-02-08 DIAGNOSIS — M70.61 GREATER TROCHANTERIC BURSITIS OF BOTH HIPS: ICD-10-CM

## 2024-02-08 DIAGNOSIS — M79.10 MYALGIA: Primary | ICD-10-CM

## 2024-02-08 DIAGNOSIS — M47.816 FACET SYNDROME, LUMBAR: ICD-10-CM

## 2024-02-08 DIAGNOSIS — M48.061 LUMBAR FORAMINAL STENOSIS: ICD-10-CM

## 2024-02-08 DIAGNOSIS — M51.36 BULGE OF LUMBAR DISC WITHOUT MYELOPATHY: ICD-10-CM

## 2024-02-08 PROCEDURE — 99213 OFFICE O/P EST LOW 20 MIN: CPT | Performed by: PHYSICAL MEDICINE & REHABILITATION

## 2024-02-08 PROCEDURE — 3008F BODY MASS INDEX DOCD: CPT | Performed by: PHYSICAL MEDICINE & REHABILITATION

## 2024-02-08 NOTE — PATIENT INSTRUCTIONS
1) Tylenol 500-1000 mg every 6-8 hours as needed for pain.  No more than 3000 mg daily.  2)  Ice 20 minutes at a time 3-4 times per day  3) Start tumeric with black pepper 1000 mg twice per day  4) Take Glucosamine with chondroitin 1500/1200 mg daily.   5) Please begin physical therapy as soon as possible.   6) Lets check in about 6 months   7) OK for Pilates and Yoga

## 2024-02-08 NOTE — PROGRESS NOTES
[facility]  Progress Note    CHIEF COMPLAINT:    Chief Complaint   Patient presents with    Low Back Pain     LOV: 5/2/23 Patient f/u on right lateral/posterior leg pain, denies denies N/T. Completed 6 sessions of physical therapy. Rates pain 1/10.       History of Present Illness:  The patient is a 68 year old  right-handed male with a significant past medical history of GERD and hyperlipidemia who presents with right lateral hip and buttock pain with radiation down to the right lateral thigh and right anterior lateral lower leg.  He rates his discomfort about a 1 out of 10 and has been doing very well with a home exercise program.  He did complete a total of 6 sessions with physical therapy over the last year.  We also performed a right greater trochanter bursa injection on 4/3/2023 which he found to be tremendously helpful.  He currently has some tingling in the right lateral lower leg and lateral malleolus region.  He is not taking any medications for pain.  He does do some home exercises where he puts himself into a standing extension position which helps.    PAST MEDICAL HISTORY:  Past Medical History:   Diagnosis Date    Asthma     Esophageal reflux     2003    High cholesterol     Hyperlipidemia     Screen for colon cancer 2022    tubular adenoma removed; unknown hx of CRC       SURGICAL HISTORY:  Past Surgical History:   Procedure Laterality Date    COLONOSCOPY      LIPOMA REMOVAL  2017    OTHER SURGICAL HISTORY      fracture RT arm     OTHER SURGICAL HISTORY  1980    exc of lipoma abd     OTHER SURGICAL HISTORY      fracture nose, rectal abscess surgery in 1978, Right arm surgery       SOCIAL HISTORY:   Social History     Occupational History    Occupation: retire , teach at [employer]   Tobacco Use    Smoking status: Former     Packs/day: 0.50     Years: 6.00     Additional pack years: 0.00     Total pack years: 3.00     Types: Cigarettes      Quit date: 10/29/1984     Years since quittin.3    Smokeless tobacco: Never    Tobacco comments:     Irregular College/Years; early Work years   Vaping Use    Vaping Use: Never used   Substance and Sexual Activity    Alcohol use: No    Drug use: Never    Sexual activity: Yes       FAMILY HISTORY:   Family History   Problem Relation Age of Onset    Dementia Father     Asthma Father     Arthritis Mother     Heart Disorder Mother     Hypertension Mother     Lipids Mother        CURRENT MEDICATIONS:   Current Outpatient Medications   Medication Sig Dispense Refill    rosuvastatin 10 MG Oral Tab Take 1 tablet (10 mg total) by mouth nightly.      Tadalafil 20 MG Oral Tab Take 1 tablet (20 mg total) by mouth daily as needed for Erectile Dysfunction.      aspirin 81 MG Oral Tab Take 1 tablet (81 mg total) by mouth daily. 2 Tablets daily         ALLERGIES:   Allergies   Allergen Reactions    Pcn [Penicillins] RASH       REVIEW OF SYSTEMS:   Review of Systems   Constitutional: Negative.    HENT: Negative.    Eyes: Negative.    Respiratory: Negative.    Cardiovascular: Negative.    Gastrointestinal: Negative.    Genitourinary: Negative.    Musculoskeletal: As per HPI  Skin: Negative.    Neurological: As per HPI  Endo/Heme/Allergies: Negative.    Psychiatric/Behavioral: Negative.      All other systems reviewed and are negative. Pertinent positives and negatives noted in the HPI.        PHYSICAL EXAM:   Ht 74\"   Wt 220 lb (99.8 kg)   BMI 28.25 kg/m²     Body mass index is 28.25 kg/m².      General: No immediate distress  Head: Normocephalic/ Atraumatic  Eyes: Extra-occular movements intact.   Ears: No auricular hematoma or deformities  Mouth: No lesions or ulcerations  Heart: peripheral pulses intact. Normal capillary refill.   Lungs: Non-labored respirations  Abdomen: No abdominal guarding  Extremities: No lower extremity edema bilaterally   Skin: No lesions noted.   Cognition: alert & oriented x 3, attentive, able to  follow 2 step commands, comprehention intact, spontaneous speech intact  Motor:    Musculoskeletal:      Data  No visits with results within 6 Month(s) from this visit.   Latest known visit with results is:   [facility] Lab Encounter on 10/13/2021   Component Date Value Ref Range Status    Prostate Specific Antigen Screen 10/13/2021 2.81  <=4.00 ng/mL Final   ]      Radiology Imaging:  I reviewed with the patient his MRI of the lumbar spine from 4/25/2023  MRI SPINE LUMBAR (CPT=72148)  Narrative: PROCEDURE: MRI SPINE LUMBAR (CPT=72148)     COMPARISON: [facility], XR LUMBAR SPINE AP LAT FLEX EXT (CPT=72110), 4/03/2023, 5:53 PM.     INDICATIONS: Lumbar radiculopathy with bilateral hip pain; greater trochanteric bursitis and primary osteoarthritis of both hips (M70.61, M70.62, M16.11, S76.019A).     TECHNIQUE: A variety of imaging planes and parameters were utilized for visualization of suspected pathology.     FINDINGS:   NUMERATION: For the purposes of this examination, the lowest fully formed disc space is designated as L5-S1.  ALIGNMENT: Levoscoliosis of the lumbar spine is appreciated. There is preservation of the expected lumbar lordosis.  BONES: No fracture or suspicious osseous lesion is evident. Multilevel anterior osteophyte formation is demonstrated. Reciprocal endplate signal abnormalities are likely degenerative in etiology.  CORD/CAUDA EQUINA: The conus medullaris terminates at the level of the L1 vertebral segment. Kinking and redundancy of the nerve roots is seen secondary to stenosis. The distal cord and nerve roots otherwise have normal caliber, contour, and signal   intensity.  PARASPINAL AREA: No visible mass.       LUMBAR DISC LEVELS:  L1-L2: A large, the mass is appreciated the sigmoid colon broad-based left larger than right foraminal protrusion. Hypertrophic facet arthropathy is present with buckling of the ligamentum flavum. There is mild prominence of dorsal epidural fat.  These   findings contribute to mild spinal canal stenosis and mild left-sided neural foraminal narrowing.  L2-L3: A diffuse disc bulge is seen with broad-based bilateral foraminal protrusions .There is bulky hypertrophic facet arthropathy buckling of the ligamentum flavum. Slight prominence of the epidural fat is demonstrated. These findings contribute to   borderline spinal canal narrowing with slight subarticular zone encroachment of the transiting L3 nerve roots the spine with mild bilateral foraminal stenosis.  L3-L4: A large, diffuse disc bulge is seen with superimposed broad-based bilateral foraminal protrusions, right larger than left. Bulky hypertrophic facet arthrosis is seen, right worse than left. There is mild buckling of the ligamentum flavum. There is   mild prominence of dorsal epidural fat. These findings result in mild-moderate spinal canal stenosis and the severe right worse than left foraminal impingement of the exiting L3 nerve roots.  L4-L5: There is a large, diffuse disc bulge with superimposed broad-based bilateral foraminal protrusions. Hypertrophic facet arthropathy is manifested with attendant buckling of the ligamentum flavum. A sequestered disc fragment extends into the right   neural foramen measuring 1.0 x 0.8 cm. Mild prominence of dorsal epidural fat is seen. These findings contribute to severe spinal canal stenosis and severe right worse than left foraminal stenosis of the exiting L4 nerve roots.  L5-S1: A large, diffuse disc bulge is seen with superimposed central and left larger than right foraminal protrusions. Facet hypertrophy is evident. These findings cause left worse than right subarticular zone impingement of the descending S1 nerve roots   and asymmetric moderate to severe left worse than right foraminal impingement of the exiting L5 nerve roots.                 Impression: CONCLUSION:   1. Multilevel degenerative changes the lumbar spine, most notably at L4-L5, where there  is severe spinal canal stenosis and a right foraminal sequestered disc fragment contributing to severe right greater than left foraminal impingement.     2. Mild-to-moderate spinal canal narrowing is seen at L3-L4, with lesser spinal canal narrowing as above described.     3. Additional subarticular zone and foraminal stenoses as detailed above.     4. No acute osseous injury of the lumbar spine is detected.     5. Lesser incidental findings as above.           elm-remote.        Dictated by (CST): Chris Rosenberg MD on 4/25/2023 at 4:21 PM       Finalized by (CST): Chris Rosenberg MD on 4/25/2023 at 4:32 PM              ASSESSMENT AND PLAN:  Sourav is a pleasant 68-year-old male who presents for follow-up of his right-sided low back pain and lumbar radiculopathy along with greater trochanteric bursitis and gluteal strain.  He has been doing very well with a home exercise program.  I am recommending he start formal physical therapy for a couple of sessions to make sure his exercises are being done correctly.  I am also recommending he use Tylenol, turmeric, glucosamine with conjoint and, and ice for pain.  He will start a Pilates and yoga program and follow-up with me in about 6 months.       RTC in 6 months  Discharge Instructions were provided as documented in AVS summary.  The patient was in agreement with the assessment and plan.  All questions were answered.  There were no barriers to learning.         1. Myalgia    2. DDD (degenerative disc disease), lumbosacral    3. Lumbar radiculopathy    4. Facet syndrome, lumbar    5. Mechanical low back pain    6. Lumbar foraminal stenosis    7. Lumbar spondylosis    8. Bulge of lumbar disc without myelopathy    9. Primary osteoarthritis of right hip    10. Scapular dyskinesis    11. Greater trochanteric bursitis of both hips    12. Biomechanical lesion    13. Strain of gluteus medius, unspecified laterality, initial encounter        Alex B. Behar MD  Physical Medicine and  Rehabilitation/Sports Medicine  [facility]

## 2024-02-22 NOTE — PATIENT INSTRUCTIONS
(R) Lower extremity 5 reps x 5 cts each 1-2x/day (10-12 pm and 4-6 pm)          (R) Shoulder photos 82-83 10 reps 4-6x/day. Addended by: EUN REYES on: 2/22/2024 01:24 PM     Modules accepted: Orders

## 2024-03-07 ENCOUNTER — TELEPHONE (OUTPATIENT)
Dept: PHYSICAL THERAPY | Facility: HOSPITAL | Age: 69
End: 2024-03-07

## 2024-03-08 ENCOUNTER — TELEPHONE (OUTPATIENT)
Dept: PHYSICAL THERAPY | Facility: HOSPITAL | Age: 69
End: 2024-03-08

## 2024-03-08 ENCOUNTER — APPOINTMENT (OUTPATIENT)
Dept: PHYSICAL THERAPY | Age: 69
End: 2024-03-08
Attending: PHYSICAL MEDICINE & REHABILITATION
Payer: COMMERCIAL

## 2024-03-12 ENCOUNTER — TELEPHONE (OUTPATIENT)
Dept: PHYSICAL THERAPY | Facility: HOSPITAL | Age: 69
End: 2024-03-12

## 2024-03-13 ENCOUNTER — OFFICE VISIT (OUTPATIENT)
Dept: PHYSICAL THERAPY | Age: 69
End: 2024-03-13
Attending: PHYSICAL MEDICINE & REHABILITATION
Payer: COMMERCIAL

## 2024-03-13 ENCOUNTER — TELEPHONE (OUTPATIENT)
Dept: PHYSICAL THERAPY | Facility: HOSPITAL | Age: 69
End: 2024-03-13

## 2024-03-13 DIAGNOSIS — G25.89 SCAPULAR DYSKINESIS: ICD-10-CM

## 2024-03-13 DIAGNOSIS — M54.59 MECHANICAL LOW BACK PAIN: ICD-10-CM

## 2024-03-13 DIAGNOSIS — M70.62 GREATER TROCHANTERIC BURSITIS OF BOTH HIPS: ICD-10-CM

## 2024-03-13 DIAGNOSIS — M47.816 LUMBAR SPONDYLOSIS: ICD-10-CM

## 2024-03-13 DIAGNOSIS — M48.061 LUMBAR FORAMINAL STENOSIS: ICD-10-CM

## 2024-03-13 DIAGNOSIS — M54.16 LUMBAR RADICULOPATHY: ICD-10-CM

## 2024-03-13 DIAGNOSIS — S76.019A STRAIN OF GLUTEUS MEDIUS, UNSPECIFIED LATERALITY, INITIAL ENCOUNTER: ICD-10-CM

## 2024-03-13 DIAGNOSIS — M51.36 BULGE OF LUMBAR DISC WITHOUT MYELOPATHY: ICD-10-CM

## 2024-03-13 DIAGNOSIS — M51.37 DDD (DEGENERATIVE DISC DISEASE), LUMBOSACRAL: ICD-10-CM

## 2024-03-13 DIAGNOSIS — M16.11 PRIMARY OSTEOARTHRITIS OF RIGHT HIP: ICD-10-CM

## 2024-03-13 DIAGNOSIS — M70.61 GREATER TROCHANTERIC BURSITIS OF BOTH HIPS: ICD-10-CM

## 2024-03-13 DIAGNOSIS — M79.10 MYALGIA: Primary | ICD-10-CM

## 2024-03-13 DIAGNOSIS — M99.9 BIOMECHANICAL LESION: ICD-10-CM

## 2024-03-13 DIAGNOSIS — M47.816 FACET SYNDROME, LUMBAR: ICD-10-CM

## 2024-03-13 PROCEDURE — 97161 PT EVAL LOW COMPLEX 20 MIN: CPT | Performed by: PHYSICAL THERAPIST

## 2024-03-13 PROCEDURE — 97110 THERAPEUTIC EXERCISES: CPT | Performed by: PHYSICAL THERAPIST

## 2024-03-13 NOTE — PROGRESS NOTES
LUMBAR SPINE EVALUATION:   Referring Physician: Dr. Behar  Diagnosis:   Myalgia (M79.10)  DDD (degenerative disc disease), lumbosacral (M51.37)  Lumbar radiculopathy (M54.16)  Facet syndrome, lumbar (M47.816)  Mechanical low back pain (M54.59)  Lumbar foraminal stenosis (M48.061)  Lumbar spondylosis (M47.816)  Bulge of lumbar disc without myelopathy (M51.36)  Primary osteoarthritis of right hip (M16.11)  Scapular dyskinesis (G25.89)  Greater trochanteric bursitis of both hips (M70.61,M70.62)  Biomechanical lesion (M99.9)  Strain of gluteus medius, unspecified laterality, initial encounter (S76.019A)   Date of Service: 3/13/2024   Date of Onset: 2/8/2024 (Rx date)    PATIENT SUMMARY   Denny Walters is a 68 year old y/o male who presents to therapy today with complaints of intermittent (R) postero-lateral calf tightness/ache rated 0-5/10.  He also report tightness at (B) posterior thigh area.  He denies any numbness nor tingling at this time.  Denny is a  who works from home and office.  He drives an SUV to/from home/work, to do errands, activities, and appointments.  He likes/enjoys playing golf, walk, go to the gym to use light resistance on machines, and read or watch TV sitting on a chair.    History of current condition:  Sourav report that he is started feeling an ache/pain in the (R) groin area after a \"flexlab\" stretching treatment, summer 2022.  He had PT treatments upto August of 2022 and Chiropractic treatment in the fall of 2022.  They help reduced the symptoms but it came back and then he had a cortisone shot in April 2023.  He had physical therapy treatments again on 6/2/2023 which helped him reduce and abolish his symptoms.   On his MD visit on February 2024 he reported on/off tightness in the (R) calf area.  He is now referred to physical therapy for evaluation and treatment.     Current functional limitations reported include inability to sit, stand for prolnged periods and drive for 2  hours without producing symptoms in the (R) postero-lateral calf area.     Patient would like to be able to return to their prior level of function.     ASSESSMENT:   Sourav is presenting with a (R) lumbar unilateral derangement with a directional preference toward lumbar extension.  He had increased lumbar mobility and decreased back symptoms during ROM re-check after this directional preference exercise.  He is also presenting with (R) LE dural nerve tension which will be addressed on his succeeding sessions.  Explained to Sourav the importance of performing his directional preference exercise to have a good and consistent carryover of his improved symptom.  Reviewed and reinforced sitting posture correction using a supportive chair with lumbar roll (patient has his own) to augment his improved condition.  He will be re-assessed next session and will be progressed or modified according to his presentation.  He understood and agreed with the plan of care.  All questions and concerns were answered and addressed at this time.          Denny would benefit from skilled Physical Therapy to address the above impairments.      Precautions: None     OBJECTIVE:   Observation/Posture:   Slouched, kyphotic, flat lumbar lordosis; forward head; stooped forward.      Gait:   Normal no assistive device.    ROM: (Pre-test symptom: 0/10 (R) postero-lateral calf)  Lumbar        Movement Loss Pain (+/-)   Flexion Nil loss NE tight/stiff back and posterior legs   Extension Moderate loss NE tight/stiff back   R Sideglide Nil loss NE tight opposite side   L Sideglide Minimal loss NE tight opposite side     Repeated motion testing/other tests:  (+) (L) LE dural nerve tension  Repeated lumbar extension in standing 2 x 10 reps; NE tight back (increased lumbar extension).  Pronelying x 1 min; NE unload back.  REIL sag x 10 (8+2) reps; NE increase in tightness lower back.  Sustained lumbar extension in pronelying with mat/table x 3 mins; NE  tight lower back.  Lumbar re-check increased lumbar extension to minimal loss; less tightness, flexion no loss with less tightness (B) hamstring tightness.  (L) SG minimal loss; no tightness opposite side    Today’s Treatment and Response:  Patient instructed and educated on lumbar mechanics, directional preference exercise, centralization of symptoms, goal setting with patient,  posture correction, and HEP.    HEP: Patient was instructed in and issued a HEP for Repeated lumbar extension in standing and/or pronelying x 10 reps 3-5x/day and sustained lumbar extension in pronelying with a pillow under chest 3-5 mins 1-2x/day.  Posture correction with lumbar roll (recommended). Avoidance of sitting on a couch/.    Charges: PT Eval x 1, TherEx x 1    Total Timed treatment: 15 min      Total Treatment Time: 45 min     PLAN OF CARE:    Goals: (12 visits)  1. Patient to consistently perform her HEP and it's progression to maintain her improved condition.   2. Patient to have reduced, centralized, and abolished symptoms in the low back to enable easier ADLs, functional, work, and recreational activities.   3. Patient to have WNL of lumbar mobility in all directions to be able to  sit, stand for prolnged periods and drive for 2 hours without producing symptoms in the (R) postero-lateral calf area.   4. Patient to consistently have good posture to promote her symptomfree condition.     Patient was advised of these findings, precautions, and treatment options and has agreed to actively participate in planning/goal setting for this course of care.    Frequency / Duration: Patient will be seen for 2-1x/week or a total of 12 visits over a 90 day period.      Treatment will include: Directional preference exercises; Therapeutic Exercises; Neuromuscular Re-education; Posture correction; Patient education; HEP progression.    Education or treatment limitation: None  Rehab Potential:good    In agreement with functional  assessment testing score and clinical rationale, this evaluation involved Low Complexity decision making due to 1-2 personal factors/comorbidities, 4+ body structures involved/activity limitations, and evolving symptoms including changing pain levels and tightness .    JITENDRA Score: Initial:  14    Patient was advised of these findings, precautions, and treatment options and has agreed to actively participate in planning and for this course of care.    Thank you for your referral. Please co-sign or sign and return this letter via fax as soon as possible to [phone]. If you have any questions, please contact me at Dept: [phone]    Sincerely,  Electronically signed by therapist: DORETHA GRIFFIN PT Cert. MDT    Physician's certification required: Yes  I certify the need for these services furnished under this plan of treatment and while under my care.    X___________________________________________________ Date____________________    Certification From: 3/13/2024  To:6/11/2024

## 2024-03-14 ENCOUNTER — APPOINTMENT (OUTPATIENT)
Dept: PHYSICAL THERAPY | Age: 69
End: 2024-03-14
Attending: PHYSICAL MEDICINE & REHABILITATION
Payer: COMMERCIAL

## 2024-03-14 NOTE — PATIENT INSTRUCTIONS
HEP: Patient was instructed in and issued a HEP for Repeated lumbar extension in standing and/or pronelying x 10 reps 3-5x/day and sustained lumbar extension in pronelying with a pillow under chest 3-5 mins 1-2x/day.  Posture correction with lumbar roll (recommended). Avoidance of sitting on a couch/.

## 2024-04-02 ENCOUNTER — OFFICE VISIT (OUTPATIENT)
Dept: PHYSICAL THERAPY | Age: 69
End: 2024-04-02
Attending: PHYSICAL MEDICINE & REHABILITATION
Payer: COMMERCIAL

## 2024-04-02 PROCEDURE — 97110 THERAPEUTIC EXERCISES: CPT | Performed by: PHYSICAL THERAPIST

## 2024-04-02 NOTE — PROGRESS NOTES
Diagnosis:  Myalgia (M79.10)  DDD (degenerative disc disease), lumbosacral (M51.37)  Lumbar radiculopathy (M54.16)  Facet syndrome, lumbar (M47.816)  Mechanical low back pain (M54.59)  Lumbar foraminal stenosis (M48.061)  Lumbar spondylosis (M47.816)  Bulge of lumbar disc without myelopathy (M51.36)  Primary osteoarthritis of right hip (M16.11)  Scapular dyskinesis (G25.89)  Greater trochanteric bursitis of both hips (M70.61,M70.62)  Biomechanical lesion (M99.9)  Strain of gluteus medius, unspecified laterality, initial encounter (S76.547A)        Referring Provider:  Behar, A.  Date of Evaluation:  3/13/2024    Precautions:  None Date POC Expires: 6/13/2024     Date of Surgery: NA    Next MD visit:   none scheduled     Today's Date: 4/2/2024    Insurance Primary/Secondary: [employer] PPO     Authorized Visits: 12     Visit Number: 2    Charges: TherEx x 2  Total Timed Treatment: 30 min  Total Treatment time: 32 min    Medication Changes since last visit?: No    Subjective:   Sourav report that he feels a little sore in the middle of the back and the back of legs because he just started doing pilates today.  He has been doing his back exercise (ANABELA hands on wall) 2x/day and using a lumbar roll when sitting.  He is expecting a call and will need to cut his therapy short today.   Objective:   - Improved standing posture with straighter upper back and retracted shoulders and less protruded head/neck.    ROM: (Pre-test symptom: 0/10 back pain and (R) postero-lateral calf)  Lumbar        Movement Loss Pain (+/-)   Flexion Nil loss NE tight (B) hamstrings   Extension Minimal loss NE sore muscles back   R Sideglide Nil loss NE tight opposite side   L Sideglide Nil loss NE tight opposite side     Date: 4/2/2024  Tx#: 2/12 Date:   Tx#: 3/ Date:   Tx#: 4/ Date:   Tx#: 5/ Date:   Tx#: 6/ Date:   Tx#: 7/ Date:   Tx#: 8/   TM: Retro walk @ 7% grade x 1.0 mph x 10 mins; NE postural training    ANABELA hands on wall x 10 reps; NE (corrected  technique)    Pronelying sustained extension using a mat table with belt OP at lower lumbar x  3 mins; P, NW mid back    Prone: (B) UE extension 1 lb 10 reps x 10 cts ea; NE    Prone: (B) UE h.abduction 1 lb 10 reps x 10 cts ea; NE tired    Prone :(B) UE scaption 10 reps x 5 cts ea; NE tired           Assessment:   Sourav continue to respond to lumbar extension directional preference exercise.  He has increased lumbar mobility without symptoms in the back and (R) leg.  He also has better posture in standing.  He was encouraged to continue with his pilates exercise and to incorporate his lower back directional preference exercise.  He understood and agreed with the treatment plan  All questions and concerns were answered and addressed at this time.      HEP:    - ANABELA hands on wall x 10 reps x 2-3x/day.    Goals:   (12 visits)  1. Patient to consistently perform her HEP and it's progression to maintain her improved condition.   2. Patient to have reduced, centralized, and abolished symptoms in the low back to enable easier ADLs, functional, work, and recreational activities.   3. Patient to have WNL of lumbar mobility in all directions to be able to  sit, stand for prolnged periods and drive for 2 hours without producing symptoms in the (R) postero-lateral calf area.   4. Patient to consistently have good posture to promote her symptomfree condition.     Plan:   Re-assess next session and continue with directional preference, posture correction, postural stability/strengthening exercise, patient education, and HEP progression.

## 2024-04-09 ENCOUNTER — APPOINTMENT (OUTPATIENT)
Dept: PHYSICAL THERAPY | Age: 69
End: 2024-04-09
Attending: PHYSICAL MEDICINE & REHABILITATION
Payer: COMMERCIAL

## 2024-04-09 ENCOUNTER — TELEPHONE (OUTPATIENT)
Dept: PHYSICAL THERAPY | Age: 69
End: 2024-04-09

## 2024-04-16 NOTE — TELEPHONE ENCOUNTER
Message Routed to Dr. Behar (informational) and Physiatry Front office staff to reach out to schedule patient's friend.  See MCM.

## 2024-04-16 NOTE — TELEPHONE ENCOUNTER
LVM for patient - more information needed regarding referred patient. Phone number of FEDERICO Aguilar?  for FEDERICO Aguilar? Any additional patient identifiers would be helpful for accurate scheduling and privacy compliance.

## 2024-04-19 ENCOUNTER — OFFICE VISIT (OUTPATIENT)
Dept: PHYSICAL THERAPY | Age: 69
End: 2024-04-19
Attending: PHYSICAL MEDICINE & REHABILITATION
Payer: COMMERCIAL

## 2024-04-19 PROCEDURE — 97110 THERAPEUTIC EXERCISES: CPT | Performed by: PHYSICAL THERAPIST

## 2024-04-19 NOTE — PROGRESS NOTES
Diagnosis:  Myalgia (M79.10)  DDD (degenerative disc disease), lumbosacral (M51.37)  Lumbar radiculopathy (M54.16)  Facet syndrome, lumbar (M47.816)  Mechanical low back pain (M54.59)  Lumbar foraminal stenosis (M48.061)  Lumbar spondylosis (M47.816)  Bulge of lumbar disc without myelopathy (M51.36)  Primary osteoarthritis of right hip (M16.11)  Scapular dyskinesis (G25.89)  Greater trochanteric bursitis of both hips (M70.61,M70.62)  Biomechanical lesion (M99.9)  Strain of gluteus medius, unspecified laterality, initial encounter (S76.019A)        Referring Provider:  Behar, A.  Date of Evaluation:  3/13/2024    Precautions:  None Date POC Expires: 6/13/2024     Date of Surgery: NA    Next MD visit:   none scheduled     Today's Date: 4/19/2024    Insurance Primary/Secondary: BCBS PPO     Authorized Visits: 12     Visit Number: 3    Charges: TherEx x 2  Total Timed Treatment: 30 min  Total Treatment time: 32 min    Medication Changes since last visit?: No    Subjective:   Sourav report that he does not have any pain/ache in the back at this time.  He has been going to [employer] 1x/week and doing his HEP (Anabela on the wall) regularly.  He also flew to [place] recently and his back did not bother him.   Objective:   - Improved standing posture with straighter upper back and retracted shoulders and less protruded head/neck.    ROM: (Pre-test symptom: 0/10 back pain and (R) postero-lateral calf)  Lumbar        Movement Loss Pain (+/-)   Flexion Nil loss NE tight (B) hamstrings   Extension Nil loss NE tight muscles back   R Sideglide Nil loss NE    L Sideglide Nil loss NE      Date: 4/2/2024  Tx#: 2/12 Date: 4/19/2024  Tx#: 3/12 Date:   Tx#: 4/ Date:   Tx#: 5/ Date:   Tx#: 6/ Date:   Tx#: 7/   TM: Retro walk @ 7% grade x 1.0 mph x 10 mins; NE postural training    ANABELA hands on wall x 10 reps; NE (corrected technique)    Pronelying sustained extension using a mat table with belt OP at lower lumbar x  3 mins; P, NW mid  back    Prone: (B) UE extension 1 lb 10 reps x 10 cts ea; NE    Prone: (B) UE h.abduction 1 lb 10 reps x 10 cts ea; NE tired    Prone :(B) UE scaption 10 reps x 5 cts ea; NE tired TM: Retro walk @ 7% grade x 1.0 mph x 10 mins; NE postural training    ANABELA hands on wall x 10 reps; NE (corrected technique)    RFISitting knees @ 90-45-0 deg x 10 reps; NE stretch only    ANABELA hands on wall x 10 reps; NE    Standing: (B) UE rhytmic-stab extension blueTB 01 sets x 10 bounces ea; NE tired    Hydrant (R/L) LE with 1 kg ball 5 reps x 10 cts ea; NE tired    Monster walk fwd/bkwd with greenTB abduction resist 3 laps x 30 feet; NE tired    ANABELA hands on wall x 10 reps; NE               Assessment:   Sourav continue to respond to lumbar extension directional preference exercise.  He has WFL of lumbar AROM in all directions without production of symptoms.  He was able to travel and carry luggage without production of symptoms.  He is also presenting with better posture in standing and sitting.  He was initiated with return to flexion program in sitting (issued)..  Explained to Sourav the purpose of this program and discussed the correct application of the exercise and the correct time of the day. He understood and agreed with the treatment plan.  All questions and concerns were answered and addressed at this time.       HEP:    - ANABELA hands on wall x 10 reps x 2-3x/day  - RFISitting knees @ 90-45-0 degs x 10 reps 1-2x/day    Goals:   (12 visits)  1. Patient to consistently perform her HEP and it's progression to maintain her improved condition.   2. Patient to have reduced, centralized, and abolished symptoms in the low back to enable easier ADLs, functional, work, and recreational activities.   3. Patient to have WNL of lumbar mobility in all directions to be able to  sit, stand for prolnged periods and drive for 2 hours without producing symptoms in the (R) postero-lateral calf area.   4. Patient to consistently have good posture to  promote her symptomfree condition.     Plan:   Re-assess next session and continue with directional preference, posture correction, postural stability/strengthening exercise, patient education, and HEP progression.

## 2024-04-23 ENCOUNTER — OFFICE VISIT (OUTPATIENT)
Dept: PHYSICAL THERAPY | Age: 69
End: 2024-04-23
Attending: PHYSICAL MEDICINE & REHABILITATION
Payer: COMMERCIAL

## 2024-04-23 PROCEDURE — 97110 THERAPEUTIC EXERCISES: CPT | Performed by: PHYSICAL THERAPIST

## 2024-04-23 NOTE — PROGRESS NOTES
Diagnosis:  Myalgia (M79.10)  DDD (degenerative disc disease), lumbosacral (M51.37)  Lumbar radiculopathy (M54.16)  Facet syndrome, lumbar (M47.816)  Mechanical low back pain (M54.59)  Lumbar foraminal stenosis (M48.061)  Lumbar spondylosis (M47.816)  Bulge of lumbar disc without myelopathy (M51.36)  Primary osteoarthritis of right hip (M16.11)  Scapular dyskinesis (G25.89)  Greater trochanteric bursitis of both hips (M70.61,M70.62)  Biomechanical lesion (M99.9)  Strain of gluteus medius, unspecified laterality, initial encounter (S76.945A)        Referring Provider:  Behar, A.  Date of Evaluation:  3/13/2024    Precautions:  None Date POC Expires: 6/13/2024     Date of Surgery: NA    Next MD visit:   none scheduled     Today's Date: 4/23/2024    Insurance Primary/Secondary: [employer] PPO     Authorized Visits: 12     Visit Number: 4    Charges: TherEx x 2  Total Timed Treatment: 34 min  Total Treatment time: 36 min    Medication Changes since last visit?: No    Subjective:   Sourav report that he has been feeling some discomfort or sensation in the back part of his (B) thighs since last Saturday.  He stopped doing his return to flexion in sitting and he also feels that he might have overdid his pilates.  He hs only been doing his lumbar extension in standing against the wall since Saturday.  He feels a better since then but it is still there.           Objective:   - Improved standing posture with straighter upper back and retracted shoulders and less protruded head/neck.    ROM: (Pre-test symptom: 2/10 (B) posterior upper thigh)  Lumbar        Movement Loss Pain (+/-)   Flexion Nil loss NE tight (B) hamstrings   Extension Minimal loss NE tight muscles back   R Sideglide Nil loss NE    L Sideglide Nil loss NE      Date: 4/2/2024  Tx#: 2/12 Date: 4/19/2024  Tx#: 3/12 Date: 4/23/2024  Tx#: 4/12 Date:   Tx#: 5/ Date:   Tx#: 6/ Date:   Tx#: 7/   TM: Retro walk @ 7% grade x 1.0 mph x 10 mins; NE postural training    ANABELA hands  on wall x 10 reps; NE (corrected technique)    Pronelying sustained extension using a mat table with belt OP at lower lumbar x  3 mins; P, NW mid back    Prone: (B) UE extension 1 lb 10 reps x 10 cts ea; NE    Prone: (B) UE h.abduction 1 lb 10 reps x 10 cts ea; NE tired    Prone :(B) UE scaption 10 reps x 5 cts ea; NE tired TM: Retro walk @ 7% grade x 1.0 mph x 10 mins; NE postural training    ANABELA hands on wall x 10 reps; NE (corrected technique)    RFISitting knees @ 90-45-0 deg x 10 reps; NE stretch only    ANABELA hands on wall x 10 reps; NE    Standing: (B) UE rhytmic-stab extension blueTB 01 sets x 10 bounces ea; NE tired    Hydrant (R/L) LE with 1 kg ball 5 reps x 10 cts ea; NE tired    Monster walk fwd/bkwd with greenTB abduction resist 3 laps x 30 feet; NE tired    ANABELA hands on wall x 10 reps; NE       TM: Retro walk @ 7% grade x 1.0 mph x 10 mins; NE postural training    ANABELA hands on wall x 10 reps; Dec, NB     Pronelying x 1 min; Dec, A    Prone: sustained lumbar extension with mat/table with belt OP at pelvis x 3 mins; , NW (B) posterior thigh sensitivity/discomfort    REIL with hands at head level x 10 reps; P, NW mid back (feels good)    Prone: sustained lumbar extension with mat/table with belt OP at lower lumbar area x 3 mins; P, NW mid back (feels good)    Seated: t/s extension x 10 reps; P, NW mid back        Assessment:   Sourav was modified to do thoracic extension directional preference exercise.  His (B) posterior thigh symptoms reduced and centralized to the lower back after this modification.  He was instructed to stop ANABELA at this time and only perform thoracic extension in sitting and pronelying.  Reinforced posture correction at this time. He is maintaining good sitting posture with a lumbar roll while sitting specially when driving.  All questions and concerns were answered and addressed at this time.       HEP:    - Seated thoracic extension with lean back on chair x 10 reps 2-3x/day or  -  REIL with hands at the level of the head x 10 reps 2-3x/day    Goals:   (12 visits)  1. Patient to consistently perform her HEP and it's progression to maintain her improved condition.   2. Patient to have reduced, centralized, and abolished symptoms in the low back to enable easier ADLs, functional, work, and recreational activities.   3. Patient to have WNL of lumbar mobility in all directions to be able to  sit, stand for prolnged periods and drive for 2 hours without producing symptoms in the (R) postero-lateral calf area.   4. Patient to consistently have good posture to promote her symptomfree condition.     Plan:   Re-assess next session and continue with directional preference, posture correction, postural stability/strengthening exercise, patient education, and HEP progression.

## 2024-04-30 ENCOUNTER — APPOINTMENT (OUTPATIENT)
Dept: PHYSICAL THERAPY | Age: 69
End: 2024-04-30
Attending: PHYSICAL MEDICINE & REHABILITATION
Payer: COMMERCIAL

## 2024-05-14 ENCOUNTER — OFFICE VISIT (OUTPATIENT)
Dept: PHYSICAL THERAPY | Age: 69
End: 2024-05-14
Attending: PHYSICAL MEDICINE & REHABILITATION
Payer: COMMERCIAL

## 2024-05-14 PROCEDURE — 97110 THERAPEUTIC EXERCISES: CPT | Performed by: PHYSICAL THERAPIST

## 2024-05-14 NOTE — PROGRESS NOTES
Diagnosis:  Myalgia (M79.10)  DDD (degenerative disc disease), lumbosacral (M51.37)  Lumbar radiculopathy (M54.16)  Facet syndrome, lumbar (M47.816)  Mechanical low back pain (M54.59)  Lumbar foraminal stenosis (M48.061)  Lumbar spondylosis (M47.816)  Bulge of lumbar disc without myelopathy (M51.36)  Primary osteoarthritis of right hip (M16.11)  Scapular dyskinesis (G25.89)  Greater trochanteric bursitis of both hips (M70.61,M70.62)  Biomechanical lesion (M99.9)  Strain of gluteus medius, unspecified laterality, initial encounter (S76.019A)        Referring Provider:  Behar, A.  Date of Evaluation:  3/13/2024    Precautions:  None Date POC Expires: 6/13/2024     Date of Surgery: NA    Next MD visit:   none scheduled     Today's Date: 5/14/2024    Insurance Primary/Secondary: [employer] PPO     Authorized Visits: 12     Visit Number: 5    Charges: TherEx x 2  Total Timed Treatment: 34 min  Total Treatment time: 36 min    Medication Changes since last visit?: No    Subjective:   Sourav report that he is not feeling any ache/pain in the back.  He has been doing his thoracic extension in sitting only the past couple of weeks but not as much as he would like to.  He was busy the whole time with a lot of board meetings.  He feel that the ANABELA for the lower back was beneficial as well as the seated thoracic extension exercise.  He is planning on going back to his [employer] workout soon and next week he will be travelling for work and will need to cancel his appointment for next week.   Objective:   - Improved standing posture with straighter upper back and retracted shoulders and less protruded head/neck.    ROM: (Pre-test symptom: 0/10 (B) posterior upper thigh)  Lumbar        Movement Loss Pain (+/-)   Flexion Nil loss NE tight (B) hamstrings   Extension Nil loss NE tight back   R Sideglide Nil loss NE    L Sideglide Nil loss NE      Date: 4/2/2024  Tx#: 2/12 Date: 4/19/2024  Tx#: 3/12 Date: 4/23/2024  Tx#: 4/12 Date:  5/14/2024  Tx#: 5/12 Date:   Tx#: 6/ Date:   Tx#: 7/   TM: Retro walk @ 7% grade x 1.0 mph x 10 mins; NE postural training    ANABELA hands on wall x 10 reps; NE (corrected technique)    Pronelying sustained extension using a mat table with belt OP at lower lumbar x  3 mins; P, NW mid back    Prone: (B) UE extension 1 lb 10 reps x 10 cts ea; NE    Prone: (B) UE h.abduction 1 lb 10 reps x 10 cts ea; NE tired    Prone :(B) UE scaption 10 reps x 5 cts ea; NE tired TM: Retro walk @ 7% grade x 1.0 mph x 10 mins; NE postural training    ANABELA hands on wall x 10 reps; NE (corrected technique)    RFISitting knees @ 90-45-0 deg x 10 reps; NE stretch only    ANABELA hands on wall x 10 reps; NE    Standing: (B) UE rhytmic-stab extension blueTB 01 sets x 10 bounces ea; NE tired    Hydrant (R/L) LE with 1 kg ball 5 reps x 10 cts ea; NE tired    Monster walk fwd/bkwd with greenTB abduction resist 3 laps x 30 feet; NE tired    ANABELA hands on wall x 10 reps; NE       TM: Retro walk @ 7% grade x 1.0 mph x 10 mins; NE postural training    ANABELA hands on wall x 10 reps; Dec, NB     Pronelying x 1 min; Dec, A    Prone: sustained lumbar extension with mat/table with belt OP at pelvis x 3 mins; , NW (B) posterior thigh sensitivity/discomfort    REIL with hands at head level x 10 reps; P, NW mid back (feels good)    Prone: sustained lumbar extension with mat/table with belt OP at lower lumbar area x 3 mins; P, NW mid back (feels good)    Seated: t/s extension x 10 reps; P, NW mid back TM: Retro walk @ 12% grade x 1.1 mph x 10 mins; NE postural training    Seated: t/s extension lean back on chair x 10 reps; NE    ANABELA hands on wall x 10 reps; NE    Hydrant (R/L) LE 1 kg ball 5 reps x 10 cts ea; NE tired    Monster walk fwd/bkwd with redTB abduction resist 3 laps x 30 feet; NE tired    (B) UE rhytmic-stab extension greenTB 10 sets x 10 bounces ea; NE tired    Prone: back extension (skydiver) 10 reps x 10 cts ea; NE tired    ANABELA hands on wall x 10  reps; NE       Assessment:   Sourav remained symptomfree in the lower back and mid back after his session.  He continue to respond to lumbar and thoracic extension directional preference exercise.  He is progressing with postural strengthening/stability exercises in standing and pronelying positions without symptoms with cueing to correct form and posture. He was tired after his session but recovered after resting.  Reinforced sitting posture correction with lumbar roll.  All questions and concerns were answered and addressed at this time.       HEP:    - Seated thoracic extension with lean back on chair x 10 reps 2-3x/day and  - ANABELA with hands on wall x 10 reps 2-3x/day    Goals:   (12 visits)  1. Patient to consistently perform her HEP and it's progression to maintain her improved condition.   2. Patient to have reduced, centralized, and abolished symptoms in the low back to enable easier ADLs, functional, work, and recreational activities.   3. Patient to have WNL of lumbar mobility in all directions to be able to  sit, stand for prolnged periods and drive for 2 hours without producing symptoms in the (R) postero-lateral calf area.   4. Patient to consistently have good posture to promote her symptomfree condition.     Plan:   Re-assess next session and continue with directional preference, posture correction, postural stability/strengthening exercise, patient education, and HEP progression.

## 2024-05-19 ENCOUNTER — HOSPITAL ENCOUNTER (OUTPATIENT)
Age: 69
Discharge: HOME | End: 2024-05-19

## 2024-05-19 VITALS
DIASTOLIC BLOOD PRESSURE: 75 MMHG | HEART RATE: 93 BPM | RESPIRATION RATE: 16 BRPM | SYSTOLIC BLOOD PRESSURE: 146 MMHG | TEMPERATURE: 97 F | OXYGEN SATURATION: 96 %

## 2024-05-19 DIAGNOSIS — J01.90 ACUTE NON-RECURRENT SINUSITIS, UNSPECIFIED LOCATION: Primary | ICD-10-CM

## 2024-05-19 LAB — SARS-COV-2 RNA RESP QL NAA+PROBE: NOT DETECTED

## 2024-05-19 RX ORDER — DOXYCYCLINE HYCLATE 100 MG/1
100 CAPSULE ORAL 2 TIMES DAILY
Qty: 14 CAPSULE | Refills: 0 | Status: SHIPPED | OUTPATIENT
Start: 2024-05-19 | End: 2024-05-26

## 2024-05-19 RX ORDER — FLUTICASONE PROPIONATE 50 MCG
1 SPRAY, SUSPENSION (ML) NASAL 2 TIMES DAILY
Qty: 1 EACH | Refills: 0 | Status: SHIPPED | OUTPATIENT
Start: 2024-05-19 | End: 2024-06-18

## 2024-05-19 NOTE — DISCHARGE INSTRUCTIONS
Take the full course of antibiotics as prescribed, even if you begin to feel better. Make sure to stay well hydrated with clear fluids. You can use Tylenol or Motrin every 6 hours to control fever or discomfort. You can use both Tylenol and Motrin, but alternate them so that you're getting one every 3 hours. Sinus rinses with a netti pot or nasal sprays can be very helpful in clearing your congestion. Using a humidifier in the bedroom at night can also be helpful. Follow up with your primary care provider or the one provided in your discharge instructions in the next 2-3 days. Seek additional care in the ER for new or worsening symptoms or for fever not controlled with Tylenol & Motrin.

## 2024-05-19 NOTE — ED PROVIDER NOTES
Patient Seen in: [facility]    History   CC: congestion  HPI: Denny Walters 68 year old male  who presents c/o head congestion, runny nose, ear fullness that have been present for the last few weeks waxing and waning in intensity however worsened in the last couple of days.  Denies fever, rash, difficulty breathing, difficulty swallowing/drooling or GI signs/symptoms.  Travels on an airplane tomorrow and wants to ensure he does not have an infection he needs antibiotics for.    Past Medical History:    Asthma (HCC)    Esophageal reflux    2003    High cholesterol    Hyperlipidemia    Screen for colon cancer    tubular adenoma removed; unknown hx of CRC       Past Surgical History:   Procedure Laterality Date    Colonoscopy      Lipoma removal      Other surgical history      fracture RT arm     Other surgical history      exc of lipoma abd     Other surgical history      fracture nose, rectal abscess surgery in , Right arm surgery       Family History   Problem Relation Age of Onset    Dementia Father     Asthma Father     Arthritis Mother     Heart Disorder Mother     Hypertension Mother     Lipids Mother        Social History     Socioeconomic History    Marital status:     Number of children: 5   Occupational History    Occupation: retire , 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   Tobacco Use    Smoking status: Former     Current packs/day: 0.00     Average packs/day: 0.5 packs/day for 6.0 years (3.0 ttl pk-yrs)     Types: Cigarettes     Start date: 10/29/1978     Quit date: 10/29/1984     Years since quittin.5    Smokeless tobacco: Never    Tobacco comments:     Irregular College/Years; early Work years   Vaping Use    Vaping status: Never Used   Substance and Sexual Activity    Alcohol use: No    Drug use: Never    Sexual activity: Yes     Social Determinants of Health      [facility]       ROS:  Review of Systems    Positive for  stated complaint: Congestion  Other systems are as noted in HPI.  Constitutional and vital signs reviewed.      All other systems reviewed and negative except as noted above.    PSFH elements reviewed from today and agreed except as otherwise stated in HPI.             Constitutional and vital signs reviewed.        Physical Exam     ED Triage Vitals [05/19/24 1017]   /75   Pulse 93   Resp 16   Temp 97.4 °F (36.3 °C)   Temp src Temporal   SpO2 96 %   O2 Device None (Room air)       Current:/75   Pulse 93   Temp 97.4 °F (36.3 °C) (Temporal)   Resp 16   SpO2 96%         PE:  General - Appears well, non-toxic and in NAD  Head - Appears symmetrical without deformity/swelling cranium, scalp, or facial bones  Eyes - sclera not injected, no discharge noted, no periorbital edema  ENT - EAC bilaterally without discharge, TM pearly grey with COL visualized appropriately bilaterally.   no nasal drainage noted in nares bilat, no cobblestoning to post. Pharynx.   Oropharynx clear, posterior pharynx is without erythema and without tonsilar enlargement or exudate, uvula midline, +gag, voice is clear. No trismus  Neck - no significant adenopathy, supple with trachea midline  Resp - Lung sounds clear bilaterally and wob unlabored, good aeration with equal, even expansion bilaterally   CV - RRR  Skin - no rashes or petechiae noted, pink warm and dry throughout, mmm, cap refill <2seconds  Neuro - A&O x4, steady gait  MSK - makes purposeful movements of all extremities, radial pulses 2+ bilat.  Psych - Interactive and appropriate      ED Course     Labs Reviewed   RAPID SARS-COV-2 BY PCR - Normal       MDM     DDx: Viral versus bacterial sinusitis, COVID-19    Rapid COVID PCR negative.  General sinusitis instructions reviewed, Flonase and antibiotic as prescribed reviewed, hydration instructions, rest, follow-up and return/ED precautions reviewed.  Patient is historian and demonstrates understanding of all instruction  and agrees with plan of care.      Disposition and Plan     Clinical Impression:  1. Acute non-recurrent sinusitis, unspecified location        Disposition:  Discharge    Follow-up:  [facility] Cline  [address] [address] [phone]    Go in 1 week  As needed      Medications Prescribed:  Discharge Medication List as of 5/19/2024 10:48 AM        START taking these medications    Details   doxycycline 100 MG Oral Cap Take 1 capsule (100 mg total) by mouth 2 (two) times daily for 7 days., Normal, Disp-14 capsule, R-0      fluticasone propionate 50 MCG/ACT Nasal Suspension 1 spray by Each Nare route in the morning and 1 spray before bedtime., Normal, Disp-1 each, R-0

## 2024-05-21 ENCOUNTER — APPOINTMENT (OUTPATIENT)
Dept: PHYSICAL THERAPY | Age: 69
End: 2024-05-21
Attending: PHYSICAL MEDICINE & REHABILITATION
Payer: COMMERCIAL

## 2024-05-28 ENCOUNTER — APPOINTMENT (OUTPATIENT)
Dept: PHYSICAL THERAPY | Age: 69
End: 2024-05-28
Attending: PHYSICAL MEDICINE & REHABILITATION
Payer: COMMERCIAL

## 2024-06-12 ENCOUNTER — TELEPHONE (OUTPATIENT)
Dept: PHYSICAL THERAPY | Age: 69
End: 2024-06-12

## 2024-06-12 ENCOUNTER — APPOINTMENT (OUTPATIENT)
Dept: PHYSICAL THERAPY | Age: 69
End: 2024-06-12
Attending: PHYSICAL MEDICINE & REHABILITATION
Payer: COMMERCIAL

## 2024-06-19 ENCOUNTER — APPOINTMENT (OUTPATIENT)
Dept: PHYSICAL THERAPY | Age: 69
End: 2024-06-19
Attending: PHYSICAL MEDICINE & REHABILITATION
Payer: COMMERCIAL

## 2025-03-15 ENCOUNTER — APPOINTMENT (OUTPATIENT)
Dept: GENERAL RADIOLOGY | Age: 70
End: 2025-03-15
Attending: PHYSICIAN ASSISTANT
Payer: COMMERCIAL

## 2025-03-15 ENCOUNTER — HOSPITAL ENCOUNTER (OUTPATIENT)
Age: 70
Discharge: HOME | End: 2025-03-15
Payer: COMMERCIAL

## 2025-03-15 VITALS
RESPIRATION RATE: 18 BRPM | TEMPERATURE: 98 F | OXYGEN SATURATION: 96 % | HEART RATE: 83 BPM | DIASTOLIC BLOOD PRESSURE: 93 MMHG | SYSTOLIC BLOOD PRESSURE: 143 MMHG

## 2025-03-15 DIAGNOSIS — S80.11XA CONTUSION OF RIGHT LOWER LEG, INITIAL ENCOUNTER: Primary | ICD-10-CM

## 2025-03-15 DIAGNOSIS — S81.801A OPEN WOUND, LOWER LEG, RIGHT, INITIAL ENCOUNTER: ICD-10-CM

## 2025-03-15 PROCEDURE — 99213 OFFICE O/P EST LOW 20 MIN: CPT | Performed by: PHYSICIAN ASSISTANT

## 2025-03-15 PROCEDURE — 73590 X-RAY EXAM OF LOWER LEG: CPT | Performed by: PHYSICIAN ASSISTANT

## 2025-03-15 RX ORDER — MUPIROCIN 20 MG/G
1 OINTMENT TOPICAL 2 TIMES DAILY
Qty: 1 G | Refills: 0 | Status: SHIPPED | OUTPATIENT
Start: 2025-03-15 | End: 2025-03-25

## 2025-03-15 RX ORDER — DOXYCYCLINE 100 MG/1
100 CAPSULE ORAL 2 TIMES DAILY
Qty: 14 CAPSULE | Refills: 0 | Status: SHIPPED | OUTPATIENT
Start: 2025-03-15 | End: 2025-03-22

## 2025-03-15 NOTE — DISCHARGE INSTRUCTIONS
CHANGE DRESSING TWICE DAILY WITH TOPICAL ANTIBIOTIC PROVIDED OVER NEXT 10 DAYS.     TAKE ORAL ANTIBIOTIC TWICE DAILY OVER NEXT 7 DAYS WITHOUT PROBIOTIC DURING AND 1 WEEK FOLLOWING COMPLETION .     READDRESS FOR WOUND CHECK OVER DAYS AHEAD FOR LINGERING OR WORSENING CONCERNS.

## 2025-03-15 NOTE — ED INITIAL ASSESSMENT (HPI)
Patient comes in states that he slammed his R tibia and now has some redness and wanted to ensure it wasn't infected.
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
## 2025-05-18 ENCOUNTER — HOSPITAL ENCOUNTER (EMERGENCY)
Facility: HOSPITAL | Age: 70
Discharge: HOME | End: 2025-05-18
Attending: EMERGENCY MEDICINE
Payer: COMMERCIAL

## 2025-05-18 ENCOUNTER — APPOINTMENT (OUTPATIENT)
Dept: GENERAL RADIOLOGY | Facility: HOSPITAL | Age: 70
End: 2025-05-18
Payer: COMMERCIAL

## 2025-05-18 VITALS
TEMPERATURE: 97 F | DIASTOLIC BLOOD PRESSURE: 95 MMHG | HEART RATE: 76 BPM | SYSTOLIC BLOOD PRESSURE: 149 MMHG | OXYGEN SATURATION: 96 % | RESPIRATION RATE: 16 BRPM | HEIGHT: 74 IN | BODY MASS INDEX: 26.56 KG/M2 | WEIGHT: 207 LBS
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
## 2025-05-18 DIAGNOSIS — M25.469 SUPRAPATELLAR EFFUSION OF KNEE: ICD-10-CM

## 2025-05-18 DIAGNOSIS — S80.212A ABRASION OF LEFT KNEE, INITIAL ENCOUNTER: ICD-10-CM

## 2025-05-18 PROCEDURE — 99283 EMERGENCY DEPT VISIT LOW MDM: CPT

## 2025-05-18 PROCEDURE — 99284 EMERGENCY DEPT VISIT MOD MDM: CPT

## 2025-05-18 PROCEDURE — 73560 X-RAY EXAM OF KNEE 1 OR 2: CPT | Performed by: EMERGENCY MEDICINE

## 2025-05-18 RX ORDER — IBUPROFEN 600 MG/1
600 TABLET, FILM COATED ORAL EVERY 8 HOURS PRN
Qty: 30 TABLET | Refills: 0 | Status: SHIPPED | OUTPATIENT
Start: 2025-05-18 | End: 2025-05-25

## 2025-05-18 RX ORDER — MOMETASONE FUROATE MONOHYDRATE 50 UG/1
1 SPRAY, METERED NASAL DAILY
COMMUNITY

## 2025-05-18 RX ORDER — LORATADINE 10 MG/1
10 TABLET ORAL DAILY
COMMUNITY
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
## 2025-05-19 NOTE — ED INITIAL ASSESSMENT (HPI)
Pt. Arrived to ED via walk in for c/o fall. Pt. Had a mechanical fall after tripping on a parking stop and landed on his left knee. Sharp Pain rated 8/10 in Left knee, difficult to bear weight on LLE.

## 2025-06-11 ENCOUNTER — HOSPITAL ENCOUNTER (OUTPATIENT)
Dept: CT IMAGING | Age: 70
Discharge: HOME | End: 2025-06-11
Attending: INTERNAL MEDICINE

## 2025-06-11 DIAGNOSIS — Z13.6 SCREENING FOR HEART DISEASE: ICD-10-CM

## 2025-06-19 ENCOUNTER — OFFICE VISIT (OUTPATIENT)
Dept: PHYSICAL MEDICINE AND REHAB | Facility: CLINIC | Age: 70
End: 2025-06-19
Payer: COMMERCIAL

## 2025-06-19 VITALS
WEIGHT: 205 LBS | SYSTOLIC BLOOD PRESSURE: 122 MMHG | HEIGHT: 74 IN | BODY MASS INDEX: 26.31 KG/M2 | DIASTOLIC BLOOD PRESSURE: 76 MMHG

## 2025-06-19 DIAGNOSIS — G25.89 SCAPULAR DYSKINESIS: ICD-10-CM

## 2025-06-19 DIAGNOSIS — M16.11 PRIMARY OSTEOARTHRITIS OF RIGHT HIP: ICD-10-CM

## 2025-06-19 DIAGNOSIS — M79.10 MYALGIA: ICD-10-CM

## 2025-06-19 DIAGNOSIS — S76.019A STRAIN OF GLUTEUS MEDIUS, UNSPECIFIED LATERALITY, INITIAL ENCOUNTER: ICD-10-CM

## 2025-06-19 DIAGNOSIS — M25.511 CHRONIC RIGHT SHOULDER PAIN: ICD-10-CM

## 2025-06-19 DIAGNOSIS — G89.29 CHRONIC RIGHT SHOULDER PAIN: ICD-10-CM

## 2025-06-19 DIAGNOSIS — S82.025D CLOSED NONDISPLACED LONGITUDINAL FRACTURE OF LEFT PATELLA WITH ROUTINE HEALING, SUBSEQUENT ENCOUNTER: Primary | ICD-10-CM

## 2025-06-19 DIAGNOSIS — M70.62 GREATER TROCHANTERIC BURSITIS OF BOTH HIPS: ICD-10-CM

## 2025-06-19 DIAGNOSIS — M99.9 BIOMECHANICAL LESION: ICD-10-CM

## 2025-06-19 DIAGNOSIS — M54.59 MECHANICAL LOW BACK PAIN: ICD-10-CM

## 2025-06-19 DIAGNOSIS — M54.16 LUMBAR RADICULOPATHY: ICD-10-CM

## 2025-06-19 DIAGNOSIS — M51.372 DEGENERATION OF INTERVERTEBRAL DISC OF LUMBOSACRAL REGION WITH DISCOGENIC BACK PAIN AND LOWER EXTREMITY PAIN: ICD-10-CM

## 2025-06-19 DIAGNOSIS — M48.061 LUMBAR FORAMINAL STENOSIS: ICD-10-CM

## 2025-06-19 DIAGNOSIS — M51.369 BULGE OF LUMBAR DISC WITHOUT MYELOPATHY: ICD-10-CM

## 2025-06-19 DIAGNOSIS — M67.919 TENDINOPATHY OF ROTATOR CUFF, UNSPECIFIED LATERALITY: ICD-10-CM

## 2025-06-19 DIAGNOSIS — M70.61 GREATER TROCHANTERIC BURSITIS OF BOTH HIPS: ICD-10-CM

## 2025-06-19 DIAGNOSIS — M47.816 LUMBAR SPONDYLOSIS: ICD-10-CM

## 2025-06-19 DIAGNOSIS — M47.816 FACET SYNDROME, LUMBAR: ICD-10-CM

## 2025-06-19 PROCEDURE — 99213 OFFICE O/P EST LOW 20 MIN: CPT | Performed by: PHYSICAL MEDICINE & REHABILITATION

## 2025-06-19 PROCEDURE — 3074F SYST BP LT 130 MM HG: CPT | Performed by: PHYSICAL MEDICINE & REHABILITATION

## 2025-06-19 PROCEDURE — 3008F BODY MASS INDEX DOCD: CPT | Performed by: PHYSICAL MEDICINE & REHABILITATION

## 2025-06-19 PROCEDURE — 3078F DIAST BP <80 MM HG: CPT | Performed by: PHYSICAL MEDICINE & REHABILITATION

## 2025-06-19 NOTE — PROGRESS NOTES
The following individual(s) verbally consented to be recorded using ambient AI listening technology and understand that they can each withdraw their consent to this listening technology at any point by asking the clinician to turn off or pause the recording:    Patient name: Denny Walters  Additional names:

## 2025-06-19 NOTE — PATIENT INSTRUCTIONS
1) Please begin physical therapy as soon as possible. This should focus on the low back, right shoulder, and left knee. The left knee can begin treatment once cleared by orthopedics  2) Tylenol 500-1000 mg every 6-8 hours as needed for pain.  No more than 3000 mg daily.  3)  Ice 20 minutes at a time 3-4 times per day  4) Start turmeric with black pepper 1000 mg twice per day  5) Take Glucosamine with chondroitin 1500/1200 mg daily.

## 2025-06-19 NOTE — PROGRESS NOTES
[facility]  Progress Note    CHIEF COMPLAINT:    Chief Complaint   Patient presents with    Follow - Up     LOV 2/8/25. Pt presents with injured R arm in May while grabbing something from rear seat of car. Initially felt pulling on shoulder, N/t in hand. Issue has resolved. Pt is done with PT with Rakesh for low back, R hip, would like to discuss further tx. Pt fractured L patella on May 18, being treated at [facility], would like to discuss with you, brace he is wearing is helping. NO pain today. XR L knee 5/18/25. No pain meds, no current PT or HEP.       History of Present Illness  Sourav Walters is a 69 year old male who presents with a left patella fracture and shoulder pain.    He sustained a left patella fracture on May 18, 2025, after tripping. He was evaluated by [facility] on May 22, 2025, and was placed in a brace. The fracture was vertical, and no surgery was performed. He is scheduled for a follow-up appointment on July 2, 2025, for further evaluation.    He has a history of disc issues and received a hip injection a couple of years ago. He has not been consistent with stretching exercises taught by Rakesh, a physical therapist, but uses recommended pillows for sitting. He has not experienced any recent hip symptoms.    He describes an incident where he reached back in a car and felt like he pulled something in his shoulder, followed by numbness that resolved. No shoulder pain, weakness, popping, snapping, bruising, or bicep muscle tightening.    He is not currently taking any medications but uses Tylenol as needed and takes turmeric and glucosamine with chondroitin as part of his vitamin regimen.       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[employer]   Tobacco Use    Smoking status:  Former     Current packs/day: 0.00     Average packs/day: 0.5 packs/day for 6.0 years (3.0 ttl pk-yrs)     Types: Cigarettes     Start date: 10/29/1978     Quit date: 10/29/1984     Years since quittin.6    Smokeless tobacco: Never    Tobacco comments:     Irregular College/Years; early Work years   Vaping Use    Vaping status: Never Used   Substance and Sexual Activity    Alcohol use: No    Drug use: Never    Sexual activity: Yes       FAMILY HISTORY:   Family History[3]    CURRENT MEDICATIONS:   Current Medications[4]    ALLERGIES:   Allergies[5]    REVIEW OF SYSTEMS:   Review of Systems   Constitutional: Negative.    HENT: Negative.    Eyes: Negative.    Respiratory: Negative.    Cardiovascular: Negative.    Gastrointestinal: Negative.    Genitourinary: Negative.    Musculoskeletal: As per HPI  Skin: Negative.    Neurological: As per HPI  Endo/Heme/Allergies: Negative.    Psychiatric/Behavioral: Negative.      All other systems reviewed and are negative. Pertinent positives and negatives noted in the HPI.    PHYSICAL EXAM:   /76   Ht 74\"   Wt 205 lb (93 kg)   BMI 26.32 kg/m²     Body mass index is 26.32 kg/m².      General: No immediate distress  Head: Normocephalic/ Atraumatic  Eyes: Extra-occular movements intact.   Ears: No auricular hematoma or deformities  Mouth: No lesions or ulcerations  Heart: peripheral pulses intact. Normal capillary refill.   Lungs: Non-labored respirations  Abdomen: No abdominal guarding  Extremities: No lower extremity edema bilaterally   Skin: No lesions noted.   Cognition: alert & oriented x 3, attentive, able to follow 2 step commands, comprehension intact, spontaneous speech intact  Motor:    Musculoskeletal:    Full range of motion of the right shoulder without pain    Data  No visits with results within 6 Month(s) from this visit.   Latest known visit with results is:   Admission on 2024, Discharged on 2024   Component Date Value Ref Range Status     Rapid SARS-CoV-2 by PCR 05/19/2024 Not Detected  Not Detected Final   ]      Radiology Imaging:  I reviewed with the patient his X-ray of the knees left   X-rays were ordered and interpreted by me and performed at [facility] [place]. Four views of the left knee demonstrate moderate   osteoarthritis in the medial compartment, mild osteoarthritis in the   lateral compartment, and mild osteoarthritis in the patellofemoral   compartment. There are osteophytes and subchondral sclerosis. He has   longitudinal non displaced fracture of the patella along the lateral   border.     ASSESSMENT AND PLAN:  Pleasant 69-year-old male who presents for follow-up.  He is doing very well as a pertains to his low back and buttock pain with radicular symptoms down the right leg.  He has been intermittently compliant with home exercise program.  I would like for him to return to physical therapy focusing on core and pelvic strengthening to prevent any further incident as a pertains to his lumbar radiculopathy.  He is also having right shoulder complaints where he reached behind him in his car and felt a sharp pull in the right anterior shoulder.  His symptoms have since been improving.  I would like him to work on some rotator cuff strengthening as well as physical therapy.  As a pertains to his left knee pain, this is due to the vertical lateral patellar fracture.  He has been following up with orthopedics at [facility].  He should follow-up with them and start therapy once he is cleared.  I will see him again as needed.     Assessment & Plan  Left patella fracture  Vertical fracture managed conservatively with brace. Expected to heal without surgery.  - Continue brace management.  - Follow up with orthopedics on July 2.  - Initiate physical therapy post-clearance from orthopedics.    Rotator cuff tendinopathy  Shoulder pain due to tendinopathy. No acute tear. Symptoms improving.  - Initiate physical therapy  for scapular mechanics and rotator cuff strengthening.  - Educated on shoulder positioning and exercises.    Degenerative disc disease, lumbosacral  Currently asymptomatic. Previous hip injections. Uses a pillow to support sitting posture and stretches.  - Continue ergonomic supports and stretching.  - Initiate physical therapy for low back function maintenance.      RTC in as needed  Discharge Instructions were provided as documented in AVS summary.  The patient was in agreement with the assessment and plan.  All questions were answered.  There were no barriers to learning.         1. Closed nondisplaced longitudinal fracture of left patella with routine healing, subsequent encounter    2. Myalgia    3. Degeneration of intervertebral disc of lumbosacral region with discogenic back pain and lower extremity pain    4. Lumbar radiculopathy    5. Facet syndrome, lumbar    6. Mechanical low back pain    7. Lumbar foraminal stenosis    8. Lumbar spondylosis    9. Bulge of lumbar disc without myelopathy    10. Primary osteoarthritis of right hip    11. Scapular dyskinesis    12. Greater trochanteric bursitis of both hips    13. Biomechanical lesion    14. Strain of gluteus medius, unspecified laterality, initial encounter    15. Chronic right shoulder pain    16. Tendinopathy of rotator cuff, unspecified laterality        Alex B. Behar MD  Physical Medicine and Rehabilitation/Sports Medicine  [facility]  [employer] Cures Act Notice to Patient: Medical documents like this are made available to patients in the interest of transparency. However, be advised this is a medical document and it is intended as [id number] communication between your medical providers. This medical document may contain abbreviations, assessments, medical data, and results or other terms that are unfamiliar. Medical documents are intended to carry relevant information, facts as evident, and the clinical opinion of the  practitioner. As such, this medical document may be written in language that appears blunt or direct. You are encouraged to contact your medical provider and/or [facility] Patient Experience if you have any questions about this medical document.   Abridge tool was used for dictation purposes only and the patient was not recorded at any point during the visit.              [1]   Past Medical History:   Asthma (HCC)    Esophageal reflux    2003    High cholesterol    Hyperlipidemia    Screen for colon cancer    tubular adenoma removed; unknown hx of CRC   [2]   Past Surgical History:  Procedure Laterality Date    Colonoscopy      Lipoma removal  2017    Other surgical history      fracture RT arm     Other surgical history  1980    exc of lipoma abd     Other surgical history      fracture nose, rectal abscess surgery in 1978, Right arm surgery   [3]   Family History  Problem Relation Age of Onset    Dementia Father     Asthma Father     Arthritis Mother     Heart Disorder Mother     Hypertension Mother     Lipids Mother    [4]   Current Outpatient Medications   Medication Sig Dispense Refill    metFORMIN 500 MG Oral Tab Take 3 tablets (1,500 mg total) by mouth 2 (two) times daily with meals.      loratadine 10 MG Oral Tab Take 1 tablet (10 mg total) by mouth daily.      mometasone furoate 50 MCG/ACT Nasal Suspension 1 spray by Nasal route daily.      rosuvastatin 10 MG Oral Tab Take 1 tablet (10 mg total) by mouth nightly.      Tadalafil 20 MG Oral Tab Take 1 tablet (20 mg total) by mouth daily as needed for Erectile Dysfunction.      aspirin 81 MG Oral Tab Take 1 tablet (81 mg total) by mouth in the morning. 2 Tablets daily .     [5]   Allergies  Allergen Reactions    Pcn [Penicillins] RASH

## 2025-06-23 ENCOUNTER — TELEPHONE (OUTPATIENT)
Dept: PHYSICAL THERAPY | Age: 70
End: 2025-06-23

## (undated) DEVICE — SUTURE PROLENE 3-0 8687H

## (undated) DEVICE — TRAY SRGPRP PVP IOD WT SCRB SM

## (undated) DEVICE — SOL IRRIG NACL 1000CC BTL .9%

## (undated) DEVICE — POOLE SUCTION INSTRUMENT WITH REMOVABLE SHEATH: Brand: POOLE

## (undated) DEVICE — SUTURE VICRYL 2-0 CT-1

## (undated) DEVICE — STERILE LATEX POWDER-FREE SURGICAL GLOVESWITH NITRILE COATING: Brand: PROTEXIS

## (undated) DEVICE — OUTPATIENT: Brand: MEDLINE INDUSTRIES, INC.

## (undated) NOTE — MR AVS SNAPSHOT
[address] [address] [address] [place]  [phone]  [phone]               Thank you for choosing us for your health care visit with [facility].   We are glad to serve you and happy to provide you with [facility] Sildenafil Citrate 100 MG Tabs   Use as directed   Commonly known as:  VIAGRA                   MyChart     Visit [facility]  You can access your MyChart to more actively manage your health care and view more details from this visit by going to [email or web address]

## (undated) NOTE — LETTER
Date: April 3, 2023      Patient Name: Arvin Wong      : 11/3/1955        Thank you for choosing [facility] [address] as your health care provider. Your physician has deemed the following medical service(s) necessary. However, your insurance plan may not pay for all of your health care and costs and may deny payment for this service. The fact that your insurance plan does not pay for an item or service does not mean you should not receive it. The purpose of this form is to help you make an informed decision about whether or not you want to receive this service(s) that may not be paid for by your insurance plan. CPT Code Description     Cost     RIGHt greater trochanter CSI under ultrasound guidance    _________ ______________________________ _____________      _________ ______________________________ _____________      I understand that the above mentioned service(s) or supply may not be covered by my insurance company.  I agree to be financially responsible for the cost of this service or supply in the event of my insurance denies payment as a non-covered benefit.        ______________________________________________________________________  Signature of Patient or Patient's Representative  Relationship  Date    ______________________________________________________________________  Signature of Witness to signing of form   Printed Name

## (undated) NOTE — MR AVS SNAPSHOT
[address] [place]  [phone]  [phone]               Thank you for choosing us for your health care visit with [facility].   We are glad to serve you and happy to provide you with [facility] Sildenafil Citrate 100 MG Tabs   Use as directed   Commonly known as:  VIAGRA                   MyChart     Visit [employer])  You can access your MyChart to more actively manage your health care and view more details from this visit by going to [email or web address]

## (undated) NOTE — LETTER
AUTHORIZATION FOR SURGICAL OPERATION OR OTHER PROCEDURE    1. I hereby authorize Dr. Brad Sampson and the [facility] staff assigned to my case to perform the following operation and/or procedure at the [facility]:    RIGHt greater trochanter CSI under ultrasound guidance    2. My physician has explained the nature and purpose of the operation or other procedure, possible alternative methods of treatment, the risks involved, and the possibility of complication to me. I acknowledge that no guarantee has been made as to the result that may be obtained. 3.  I recognize that, during the course of this operation, or other procedure, unforseen conditions may necessitate additional or different procedure than those listed above. I, therefore, further authorize and request that the above named physician, his/her physician assistants or designees perform such procedures as are, in his/her professional opinion, necessary and desirable. 4.  Any tissue or organs removed in the operation or other procedure may be disposed of by and at the discretion of the [facility] staff and [facility] AT [facility]. 5.  I understand that in the event of a medical emergency, I will be transported by local paramedics to [facility] or other hospital emergency department. 6.  I certify that I have read and fully understand the above consent to operation and/or other procedure. 7.  I acknowledge that my physician has explained sedation/analgesia administration to me including the risks and benefits. I consent to the administration of sedation/analgesia as may be necessary or desirable in the judgement of my physician. Witness signature: ___________________________________________________ Date:  ______/______/_____                    Time:  ________ A. M.  P.M.        Patient Name:  Arvin Wong  11/3/1955  [id number]     Patient signature:  ___________________________________________________                 Statement of Physician  My signature below affirms that prior to the time of the procedure, I have explained to the patient and/or his/her guardian, the risks and benefits involved in the proposed treatment and any reasonable alternative to the proposed treatment. I have also explained the risks and benefits involved in the refusal of the proposed treatment and have answered the patient's questions.                         Date:  ______/______/_______  Provider                      Signature:  __________________________________________________________       Time:  ___________ A.M    P.M.

## (undated) NOTE — MR AVS SNAPSHOT
[address]  [phone]  [phone]               Thank you for choosing us for your health care visit with [facility].   We are glad to serve you and happy to provide you with [facility] If you've recently had a stay at the Hospital you can access your discharge instructions in [employer] by going to Visits < Admission Summaries.  If you've been to the Emergency Department or your doctor's office, you can view your past visit information in My

## (undated) NOTE — MR AVS SNAPSHOT
[address]  [phone]  [phone]               Thank you for choosing us for your health care visit with [facility] [facility].   We are glad to serve you and happy to provide you with [facility] Commonly known as:  SELSUN           Sildenafil Citrate 100 MG Tabs   Use as directed   Commonly known as:  VIAGRA                   MyChart     Visit [employer]  You can access your MyChart to more actively manage your health care and view more details from 2 ½ hours per week – spread out over time Use a mina to keep you motivated   Don’t forget strength training with weights and resistance Set goals and track your progress   You don’t need to join a gym. Home exercises work great.  Put more priority on exe

## (undated) NOTE — MR AVS SNAPSHOT
[address] [facility] [address]  [phone]  [phone]               Thank you for choosing us for your health care visit with [facility].   We are glad to serve you and happy to provide you with [facility] [email or web address]. If you've recently had a stay at the Hospital you can access your discharge instructions in [employer] by going to Visits < Admission Summaries.  If you've been to the Emergency Department or your doctor's office, you can view yo